# Patient Record
Sex: FEMALE | Employment: OTHER | URBAN - METROPOLITAN AREA
[De-identification: names, ages, dates, MRNs, and addresses within clinical notes are randomized per-mention and may not be internally consistent; named-entity substitution may affect disease eponyms.]

---

## 2017-02-14 ENCOUNTER — PATIENT OUTREACH (OUTPATIENT)
Dept: FAMILY MEDICINE CLINIC | Age: 67
End: 2017-02-14

## 2017-02-14 ENCOUNTER — OFFICE VISIT (OUTPATIENT)
Dept: FAMILY MEDICINE CLINIC | Age: 67
End: 2017-02-14

## 2017-02-14 VITALS
DIASTOLIC BLOOD PRESSURE: 80 MMHG | RESPIRATION RATE: 16 BRPM | HEIGHT: 61 IN | BODY MASS INDEX: 34.32 KG/M2 | WEIGHT: 181.8 LBS | OXYGEN SATURATION: 95 % | SYSTOLIC BLOOD PRESSURE: 128 MMHG | HEART RATE: 80 BPM | TEMPERATURE: 98.6 F

## 2017-02-14 DIAGNOSIS — Z79.4 UNCONTROLLED TYPE 2 DIABETES MELLITUS WITH HYPERGLYCEMIA, WITH LONG-TERM CURRENT USE OF INSULIN (HCC): Primary | ICD-10-CM

## 2017-02-14 DIAGNOSIS — E11.65 UNCONTROLLED TYPE 2 DIABETES MELLITUS WITH HYPERGLYCEMIA, WITH LONG-TERM CURRENT USE OF INSULIN (HCC): Primary | ICD-10-CM

## 2017-02-14 LAB — HBA1C MFR BLD HPLC: 10.4 %

## 2017-02-14 NOTE — PROGRESS NOTES
Nurse history read and confirmed by patient. States taking usual dose of insulin but A1c today > 10. No recent f/u with endo. States taking all of her current meds but review of refill history shows should be out of several of her meds. Advised patient she needs to heath appt ASAP with her doctor at [facility] who is functioning as her endo and she needs to bring all of her med bottles in at a NV w/i the next week to go over what she has and has not been taking and to refill her meds as approp.     Visit Vitals    /80 (BP 1 Location: Right arm, BP Patient Position: Sitting)    Pulse 80    Temp 98.6 °F (37 °C) (Oral)    Resp 16    Ht 5' 1\" (1.549 m)    Wt 181 lb 12.8 oz (82.5 kg)    SpO2 95%    BMI 34.35 kg/m2

## 2017-02-14 NOTE — PROGRESS NOTES
[address]    Pt listed on 2/13/17 MSSP-P ISABEL NOONAN ED Report. Seen @ [facility] EMERGENCY [facility] ED 2/10/17. Lurn[facility] system reviewed. 2/10/17 ED Provider Report retrieved & reviewed. Diagnoses:  Uncontrolled DM, URI    -436 during ED visit. Given NaCl Bolus & Reg Insulin 10 units IV. Social Hx- \"from out of town, visiting locally\". Discharge Plan/Instructions:  1. Disposition- home   2. ABX- Z pac  3. PCP f/u    Per EMR review pt was seen in office today by Dr Jaida Viveros. A1C >10. Pt instructed to see provider @ [facility] managing DM as soon as possible. No Rxs given. Pt instructed to return in 1 week with all meds for med review & refills. NN did not meet with pt when in office. Barriers: hx of f/u with multiple providers. Pt moved to [place]. Returns to visit in [place]. [facility] NN has spoken with pt in past regarding establishing with PCP in [place] for Continuity of Care & management of DM & other chronic conditions. This NN has attempted to contact pt for f/u on PCP status w/o pt response to messages left. Per [address] pt sees Masood Smiley MD for DM Management. [facility] site reviewed. No recent OVs listed. Last notation a \"Communication\" note dated 12/13/16- \"Spoke with Pina Montiel, [phone], from [facility], who was calling to clarify that pt. sees Dr. Thomas Barrow for diabetes only. Pt. has been seeing Dr. Samantha Kaba at [facility] for a long time now. Spoke to Dr. Thomas Barrow who confirms that she only sees patient for diabetes and does not see her as a pcp. Noted that pt. does call clinic to schedule appointments for sick visits with other physicians at [facility]". Per EMR review no f/u NV appt scheduled by pt today. Plan:  Pt outreach to discuss/review importance of continuity of care/management of chronic conditions & associated risk factors.

## 2017-02-14 NOTE — PROGRESS NOTES
Here for follow up. She has been sick for the last couple week and has been to PF in [address] have had her on 2 different antibiotics. Has also been [address] since her sugar was so high--Iris has the notes from the visit. Patient still weak and dizzy. She has not seen her DM specialist. Had been in [place] until the first week of December--came back \" just in time to get sick\". She does not have ENDO in [place] either. She is planning on staying in [place]-will only go to [place] to visit, not to live. Right to Decide booklet given to patient and encouraged to discuss with family. PF notes requested for stat delivery. We have the most recent note from Dr Lena Hunt who follows her DM. Patient said he had eye surgery while in [place] and this really helped her lifelong vision issue.  She does not remember the name of the MD who did the procedure

## 2017-02-14 NOTE — MR AVS SNAPSHOT
Visit Information Date & Time Provider Department Dept. Phone Encounter #  
 2/14/2017  3:30 PM Marifer Delgadillo MD [facility] [phone] [id number] Follow-up Instructions Return in about 1 week (around 2/21/2017) for NV to go over meds. Upcoming Health Maintenance Date Due OSTEOPOROSIS SCREENING (DEXA) 5/15/2017* MEDICARE YEARLY EXAM 5/15/2017* FOOT EXAM Q1 5/15/2017* MICROALBUMIN Q1 5/15/2017* EYE EXAM RETINAL OR DILATED Q1 5/15/2017* LIPID PANEL Q1 5/15/2017* GLAUCOMA SCREENING Q2Y 4/30/2017 HEMOGLOBIN A1C Q6M 8/14/2017 BREAST CANCER SCRN MAMMOGRAM 12/7/2017 COLONOSCOPY 7/22/2021 DTaP/Tdap/Td series (2 - Td) 10/10/2022 *Topic was postponed. The date shown is not the original due date. Allergies as of 2/14/2017  Review Complete On: 2/14/2017 By: Katie Wilkins LPN Severity Noted Reaction Type Reactions Cefdinir High 01/12/2016    Other (comments) Arthralgias and serum sickness Advil Cold & Sinus [Pseudoephedrine-ibuprofen]  09/11/2009    Palpitations Cefzil [Cefprozil]  09/11/2009    Rash Ciprofloxacin  01/12/2016    Other (comments) arthralgias Invokana [Canagliflozin]  01/12/2016    Other (comments) Caused her to have frequent UTI's Naprosyn [Naproxen]  09/11/2009    Other (comments) Blisters in mouth Current Immunizations  Reviewed on 10/6/2015 Name Date IPV 5/30/2002 Influenza High Dose Vaccine PF 10/6/2015 Influenza Vaccine PF 12/17/2013 Influenza Vaccine Split 10/10/2012, 11/16/2010 Pneumococcal Polysaccharide (PPSV-23) 6/3/2014 Pneumococcal Vaccine (Unspecified Type) 6/17/2003 TD Vaccine 6/10/1997 TDAP Vaccine 10/10/2012 Zoster Vaccine, Live 4/24/2013 Not reviewed this visit You Were Diagnosed With   
  
 Codes Comments  Uncontrolled type 2 diabetes mellitus with hyperglycemia, with long-term current use of insulin ([facility].)    -  Primary ICD-10-CM: E11.65, Z79.4 ICD-9-CM: 250.02, V58.67 Vitals BP Pulse Temp Resp Height(growth percentile) Weight(growth percentile) 128/80 (BP 1 Location: Right arm, BP Patient Position: Sitting) 80 98.6 °F (37 °C) (Oral) 16 5' 1\" (1.549 m) 181 lb 12.8 oz (82.5 kg) SpO2 BMI OB Status Smoking Status 95% 34.35 kg/m2 Postmenopausal Never Smoker Vitals History BMI and BSA Data Body Mass Index Body Surface Area  
 34.35 kg/m 2 1.88 m 2 Preferred Pharmacy Pharmacy Name Phone [address] [facility] [phone] Your Updated Medication List  
  
   
This list is accurate as of: 2/14/17  3:47 PM.  Always use your most recent med list.  
  
  
  
  
 buPROPion  mg tablet Commonly known as:  WELLBUTRIN XL  
TAKE 1 TABLET DAILY coenzyme q10-vitamin e 100-100 mg-unit Cap Take  by mouth.  
  
 ergocalciferol (vitamin d2) 1,000 unit Cap Take 2,000 Int'l Units/L by mouth.  
  
 escitalopram oxalate 20 mg tablet Commonly known as:  Kristina London TAKE 1 TABLET DAILY HumaLOG 100 unit/mL injection Generic drug:  insulin lispro 10 Units by SubCUTAneous route three (3) times daily. indapamide 2.5 mg tablet Commonly known as:  LOZOL  
TAKE 2 TABLETS EVERY MORNING KRILL OIL PO Take  by mouth daily. LEVEMIR FLEXPEN 100 unit/mL (3 mL) Inpn Generic drug:  insulin detemir 32 Units by SubCUTAneous route. Every morning  
  
 lisinopril 10 mg tablet Commonly known as:  PRINIVIL, ZESTRIL  
TAKE 1 TABLET DAILY  
  
 metFORMIN 500 mg tablet Commonly known as:  GLUCOPHAGE  
TAKE 2 TABLETS DAILY WITH BREAKFAST  
  
 metoprolol succinate 200 mg XL tablet Commonly known as:  TOPROL-XL  
TAKE 1 TABLET DAILY  
  
 ONETOUCH ULTRA TEST strip Generic drug:  glucose blood VI test strips  
by Does Not Apply route See Admin Instructions. ONETOUCH ULTRA2 monitoring kit Generic drug:  Blood-Glucose Meter  
by Does Not Apply route. ONETOUCH ULTRASOFT LANCETS Misc Generic drug:  Lancets  
by Does Not Apply route. QUEtiapine 25 mg tablet Commonly known as:  SEROquel TAKE 3 TABLETS NIGHTLY We Performed the Following AMB POC HEMOGLOBIN A1C [00239 CPT(R)] Follow-up Instructions Return in about 1 week (around 2/21/2017) for NV to go over meds. Introducing [facility]! Jazmine Small introduces Proofpoint patient portal. Now you can access parts of your medical record, email your doctor's office, and request medication refills online. 1. In your internet browser, go to [email or web address] 2. Click on the First Time User? Click Here link in the Sign In box. You will see the New Member Sign Up page. 3. Enter your Proofpoint Access Code exactly as it appears below. You will not need to use this code after youve completed the sign-up process. If you do not sign up before the expiration date, you must request a new code. · Proofpoint Access Code: [email or web address] Expires: 5/15/2017  1:56 PM 
 
4. Enter the last four digits of your Social Security Number (xxxx) and Date of Birth (mm/dd/yyyy) as indicated and click Submit. You will be taken to the next sign-up page. 5. Create a Proofpoint ID. This will be your Proofpoint login ID and cannot be changed, so think of one that is secure and easy to remember. 6. Create a Proofpoint password. You can change your password at any time. 7. Enter your Password Reset Question and Answer. This can be used at a later time if you forget your password. 8. Enter your e-mail address. You will receive e-mail notification when new information is available in [address] 9. Click Sign Up. You can now view and download portions of your medical record. 10. Click the Download Summary menu link to download a portable copy of your medical information. If you have questions, please visit the Frequently Asked Questions section of the [employer] website. Remember, [employer] is NOT to be used for urgent needs. For medical emergencies, dial 911. Now available from your iPhone and Android! Please provide this summary of care documentation to your next provider. Your primary care clinician is listed as [phone] If you have any questions after today's visit, please call [phone].

## 2017-03-27 PROBLEM — H04.123 DRY EYES: Status: ACTIVE | Noted: 2017-03-20

## 2017-03-27 PROBLEM — H11.30 SUBCONJUNCTIVAL HEMORRHAGE: Status: ACTIVE | Noted: 2017-03-20

## 2017-04-07 RX ORDER — ESCITALOPRAM OXALATE 20 MG/1
TABLET ORAL
Qty: 90 TAB | Refills: 1 | OUTPATIENT
Start: 2017-04-07

## 2017-04-07 NOTE — TELEPHONE ENCOUNTER
Refill request per fax from [address]. She was in the office for follow up mid Feb and she needs to come back in to the office and bring the bottles of all her current meds for review. This is what Dr Ade Elise had told her at the Feb visit. The IM med MD at [facility] only handles her DM and they will not fill her all of her other meds.

## 2017-05-03 ENCOUNTER — OFFICE VISIT (OUTPATIENT)
Dept: FAMILY MEDICINE CLINIC | Age: 67
End: 2017-05-03

## 2017-05-03 VITALS
TEMPERATURE: 97.3 F | SYSTOLIC BLOOD PRESSURE: 126 MMHG | HEIGHT: 61 IN | DIASTOLIC BLOOD PRESSURE: 79 MMHG | RESPIRATION RATE: 16 BRPM | OXYGEN SATURATION: 95 % | WEIGHT: 174.7 LBS | HEART RATE: 80 BPM | BODY MASS INDEX: 32.98 KG/M2

## 2017-05-03 DIAGNOSIS — R39.14 FEELING OF INCOMPLETE BLADDER EMPTYING: Primary | ICD-10-CM

## 2017-05-03 DIAGNOSIS — E11.65 UNCONTROLLED TYPE 2 DIABETES MELLITUS WITH HYPERGLYCEMIA, WITH LONG-TERM CURRENT USE OF INSULIN (HCC): ICD-10-CM

## 2017-05-03 DIAGNOSIS — Z79.4 UNCONTROLLED TYPE 2 DIABETES MELLITUS WITH HYPERGLYCEMIA, WITH LONG-TERM CURRENT USE OF INSULIN (HCC): ICD-10-CM

## 2017-05-03 DIAGNOSIS — L40.9 PSORIASIS: ICD-10-CM

## 2017-05-03 RX ORDER — LIRAGLUTIDE 6 MG/ML
1.8 INJECTION SUBCUTANEOUS DAILY
COMMUNITY
Start: 2017-03-02 | End: 2017-08-02 | Stop reason: SINTOL

## 2017-05-03 NOTE — MR AVS SNAPSHOT
Visit Information Date & Time Provider Department Dept. Phone Encounter #  
 5/3/2017 11:45 AM Robin Dutton MD [facility] [phone] [id number] Follow-up Instructions Return if symptoms worsen or fail to improve. Your Appointments 5/3/2017 11:45 AM  
ROUTINE CARE with MD [facility] [address]) Appt Note: Routine f/up check., DM, med renewal - (30 min) -lp [address] [facility] [address]  
[phone]  
  
   
 [address] [facility] [address] [facility] [address] [facility] Upcoming Health Maintenance Date Due OSTEOPOROSIS SCREENING (DEXA) 5/15/2017* MEDICARE YEARLY EXAM 5/15/2017* FOOT EXAM Q1 5/15/2017* MICROALBUMIN Q1 5/15/2017* LIPID PANEL Q1 5/15/2017* INFLUENZA AGE 9 TO ADULT 8/1/2017 HEMOGLOBIN A1C Q6M 8/14/2017 EYE EXAM RETINAL OR DILATED Q1 10/4/2017 BREAST CANCER SCRN MAMMOGRAM 12/7/2017 GLAUCOMA SCREENING Q2Y 10/4/2018 COLONOSCOPY 7/22/2021 DTaP/Tdap/Td series (2 - Td) 10/10/2022 *Topic was postponed. The date shown is not the original due date. Allergies as of 5/3/2017  Review Complete On: 5/3/2017 By: Alexandria Garcia RN Severity Noted Reaction Type Reactions Cefdinir High 01/12/2016    Other (comments) Arthralgias and serum sickness Advil Cold & Sinus [Pseudoephedrine-ibuprofen]  09/11/2009    Palpitations Cefzil [Cefprozil]  09/11/2009    Rash Ciprofloxacin  01/12/2016    Other (comments) arthralgias Invokana [Canagliflozin]  01/12/2016    Other (comments) Caused her to have frequent UTI's Naprosyn [Naproxen]  09/11/2009    Other (comments) Blisters in mouth Current Immunizations  Reviewed on 10/6/2015 Name Date IPV 5/30/2002 Influenza High Dose Vaccine PF 10/6/2015 Influenza Vaccine PF 12/17/2013 Influenza Vaccine Split 10/10/2012, 11/16/2010 Pneumococcal Polysaccharide (PPSV-23) 6/3/2014 Pneumococcal Vaccine (Unspecified Type) 6/17/2003 TD Vaccine 6/10/1997 TDAP Vaccine 10/10/2012 Zoster Vaccine, Live 4/24/2013 Not reviewed this visit You Were Diagnosed With   
  
 Codes Comments Feeling of incomplete bladder emptying    -  Primary ICD-10-CM: R39.14 ICD-9-CM: 788.21 Psoriasis     ICD-10-CM: L40.9 ICD-9-CM: 696.1 Uncontrolled type 2 diabetes mellitus with hyperglycemia, with long-term current use of insulin (HCC)     ICD-10-CM: E11.65, Z79.4 ICD-9-CM: 250.02, V58.67 Vitals BP Pulse Temp Resp Height(growth percentile) Weight(growth percentile) 126/79 (BP 1 Location: Left arm, BP Patient Position: Sitting) 80 97.3 °F (36.3 °C) (Oral) 16 5' 1\" (1.549 m) 174 lb 11.2 oz (79.2 kg) SpO2 BMI OB Status Smoking Status 95% 33.01 kg/m2 Postmenopausal Never Smoker Vitals History BMI and BSA Data Body Mass Index Body Surface Area 33.01 kg/m 2 1.85 m 2 Preferred Pharmacy Pharmacy Name Phone [address] [facility] [phone] Your Updated Medication List  
  
   
This list is accurate as of: 5/3/17 11:22 AM.  Always use your most recent med list.  
  
  
  
  
 buPROPion  mg tablet Commonly known as:  WELLBUTRIN XL  
TAKE 1 TABLET DAILY  
  
 escitalopram oxalate 20 mg tablet Commonly known as:  Mart Bandar TAKE 1 TABLET DAILY HumaLOG 100 unit/mL injection Generic drug:  insulin lispro 10 Units by SubCUTAneous route three (3) times daily. indapamide 2.5 mg tablet Commonly known as:  LOZOL  
TAKE 2 TABLETS EVERY MORNING  
  
 LEVEMIR FLEXPEN 100 unit/mL (3 mL) Inpn Generic drug:  insulin detemir 32 Units by SubCUTAneous route. Every morning  
  
 lisinopril 10 mg tablet Commonly known as:  PRINIVIL, ZESTRIL  
TAKE 1 TABLET DAILY  
  
 metFORMIN 500 mg tablet Commonly known as:  GLUCOPHAGE  
 TAKE 2 TABLETS DAILY WITH BREAKFAST  
  
 metoprolol succinate 200 mg XL tablet Commonly known as:  TOPROL-XL  
TAKE 1 TABLET DAILY  
  
 ONETOUCH ULTRA TEST strip Generic drug:  glucose blood VI test strips  
by Does Not Apply route See Admin Instructions. ONETOUCH ULTRA2 monitoring kit Generic drug:  Blood-Glucose Meter  
by Does Not Apply route. ONETOUCH ULTRASOFT LANCETS Misc Generic drug:  Lancets  
by Does Not Apply route. QUEtiapine 25 mg tablet Commonly known as:  SEROquel TAKE 3 TABLETS NIGHTLY  
  
 VICTOZA 3-ASMITA 0.6 mg/0.1 mL (18 mg/3 mL) sub-q pen Generic drug:  Liraglutide 1.8 mg by SubCUTAneous route daily. We Performed the Following REFERRAL TO FEMALE PELVIC MEDICINE AND RECONSTRUCTIVE SURGERY [NJL558 Custom] Follow-up Instructions Return if symptoms worsen or fail to improve. Referral Information Referral ID Referred By Referred To  
  
 [id number] Josh APARICIO MD   
   [facility] [address] [facility], [address] Phone: [phone] Fax: [phone] Visits Status Start Date End Date 1 New Request 5/3/17 5/3/18 If your referral has a status of pending review or denied, additional information will be sent to support the outcome of this decision. Introducing \[facility]\"" & HEALTH SERVICES! [employer] introduces CoffeeTable patient portal. Now you can access parts of your medical record, email your doctor's office, and request medication refills online. 1. In your internet browser, go to [email or web address] 2. Click on the First Time User? Click Here link in the Sign In box. You will see the New Member Sign Up page. 3. Enter your CoffeeTable Access Code exactly as it appears below. You will not need to use this code after youve completed the sign-up process. If you do not sign up before the expiration date, you must request a new code. · [employer] Access Code: [email or web address] Expires: 5/15/2017  2:56 PM 
 
4. Enter the last four digits of your Social Security Number (xxxx) and Date of Birth (mm/dd/yyyy) as indicated and click Submit. You will be taken to the next sign-up page. 5. Create a Industri[employer] ID. This will be your [employer] login ID and cannot be changed, so think of one that is secure and easy to remember. 6. Create a [employer] password. You can change your password at any time. 7. Enter your Password Reset Question and Answer. This can be used at a later time if you forget your password. 8. Enter your e-mail address. You will receive e-mail notification when new information is available in [address] 9. Click Sign Up. You can now view and download portions of your medical record. 10. Click the Download Summary menu link to download a portable copy of your medical information. If you have questions, please visit the Frequently Asked Questions section of the [employer] website. Remember, [employer] is NOT to be used for urgent needs. For medical emergencies, dial 911. Now available from your iPhone and Android! Please provide this summary of care documentation to your next provider. Your primary care clinician is listed as [phone] If you have any questions after today's visit, please call [phone].

## 2017-05-03 NOTE — PROGRESS NOTES
Used cream for psoriasis which worked on upper left arm. Doesn't know name of the cream  Will find out from MD or pharm in [place]. Needs ref to uroGyn for inability to empty bladder. Nurse history read and confirmed by patient. Visit Vitals    /79 (BP 1 Location: Left arm, BP Patient Position: Sitting)    Pulse 80    Temp 97.3 °F (36.3 °C) (Oral)    Ht 5' 1\" (1.549 m)    Wt 174 lb 11.2 oz (79.2 kg)    SpO2 95%    BMI 33.01 kg/m2       Patient alert and cooperative. Erythematous scaly patch left upper arm. Assessment:  1. Apparent psoriasis. Plan:  1. Patient will find out name of previous cream she got from doctor in [place] that worked well on this area. 2. Set up uro-gyn referral for difficulty with full bladder emptying, Dr. Jane Yap. 3. Follow otherwise here prn.  4. Is apparently getting refills and diabetic care from her physician at [facility].

## 2017-05-03 NOTE — PROGRESS NOTES
Chief Complaint   Patient presents with    Diabetes    Skin Problem     Cream for psoriasis.  Incomplete Bladder Emptying     Need a referral to a specialist.     1. Have you been to the ER, urgent care clinic since your last visit? Hospitalized since your last visit? Yes When: 2017 Where: [facility] Reason for visit: Blood sugar elevated. 2. Have you seen or consulted any other health care providers outside of the [facility] since your last visit? Include any pap smears or colon screening. Yes When: 2017 Where: [facility] Reason for visit: Liver biopsy    I have reviewed Health Maintenance with the patient and updated. Advance Care Planning information reviewed and given to the patient at a previous visit. The patient was counseled on the dangers of tobacco use, and Patient is a non smoker. Reviewed strategies to maximize success, including Continue not to smoke.

## 2017-05-09 RX ORDER — HALOBETASOL PROPIONATE 0.5 MG/G
CREAM TOPICAL 2 TIMES DAILY
Qty: 15 G | Refills: 0 | Status: SHIPPED | OUTPATIENT
Start: 2017-05-09 | End: 2017-08-02 | Stop reason: SDUPTHER

## 2017-06-13 DIAGNOSIS — G47.00 INSOMNIA, UNSPECIFIED TYPE: ICD-10-CM

## 2017-06-13 DIAGNOSIS — R45.86 MOOD CHANGES: ICD-10-CM

## 2017-06-13 RX ORDER — QUETIAPINE FUMARATE 25 MG/1
TABLET, FILM COATED ORAL
Qty: 90 TAB | Refills: 2 | OUTPATIENT
Start: 2017-06-13

## 2017-06-13 NOTE — TELEPHONE ENCOUNTER
Patient calling to request a refill for her seroquel 25 mg to be called into the [facility] on [facility]. Her contact # is [phone].

## 2017-06-15 NOTE — TELEPHONE ENCOUNTER
Patient came by the office with the empty bottle of the Seroquel  It was written 11/17/16 by Dr Samir Szymanski for 629+1  She got the 2nd refill on 2/17/17 from [facility]  Refilled pended to PCP  She was in the office in May for visit

## 2017-06-15 NOTE — TELEPHONE ENCOUNTER
Patient will bring the bottle to the office for review. Med is ordered to take 3 at hs but she rations to a lesser dose if she is running low.  3 tablets is the most effective dose for her

## 2017-06-19 RX ORDER — QUETIAPINE FUMARATE 25 MG/1
TABLET, FILM COATED ORAL
Qty: 270 TAB | Refills: 1 | Status: SHIPPED | OUTPATIENT
Start: 2017-06-19 | End: 2017-12-16 | Stop reason: SDUPTHER

## 2017-06-19 NOTE — TELEPHONE ENCOUNTER
Patient came by the office with the empty bottle of the Seroquel  It was written 11/17/16 by Dr Gregorio Yu for 672+3  She got the 2nd refill on 2/17/17 from [facility]  Refilled pended to PCP  She was in the office in May for visit [address] [facility] [address]

## 2017-07-06 NOTE — TELEPHONE ENCOUNTER
She is on this once daily and needs a refill-will fax back the fax request we rec'd. Patient verified this is a current med for her.  She is due in for [facility] and to update her HM and for annual fasting labs with next open slot with Dr Anthony Garcia

## 2017-07-09 RX ORDER — ESCITALOPRAM OXALATE 20 MG/1
TABLET ORAL
Qty: 90 TAB | Refills: 0
Start: 2017-07-09 | End: 2017-09-20 | Stop reason: SDUPTHER

## 2017-07-21 ENCOUNTER — TELEPHONE (OUTPATIENT)
Dept: FAMILY MEDICINE CLINIC | Age: 67
End: 2017-07-21

## 2017-07-21 NOTE — TELEPHONE ENCOUNTER
Left message on name ID'd voicemail that we do not follow her DM at this office. We did do a A1c 2/14/17 and it was 10.4  Patient sees Dr Shayna Nails at [facility] for this-info for her office left on the voicemail.  Patient also see providers in [place] when she lives there

## 2017-07-21 NOTE — TELEPHONE ENCOUNTER
----- Message from Billy Bradley sent at 7/21/2017  3:59 PM EDT -----  Regarding: Dr. Familia Reed, RN, with [facility], is requesting a call back from the practice to see if the pt has had A1C testing done. Best contact number [phone] ext [phone].

## 2017-07-26 ENCOUNTER — LAB ONLY (OUTPATIENT)
Dept: FAMILY MEDICINE CLINIC | Age: 67
End: 2017-07-26

## 2017-07-26 DIAGNOSIS — E55.9 VITAMIN D DEFICIENCY: ICD-10-CM

## 2017-07-26 DIAGNOSIS — Z79.4 UNCONTROLLED TYPE 2 DIABETES MELLITUS WITH HYPERGLYCEMIA, WITH LONG-TERM CURRENT USE OF INSULIN (HCC): ICD-10-CM

## 2017-07-26 DIAGNOSIS — I10 ESSENTIAL HYPERTENSION: ICD-10-CM

## 2017-07-26 DIAGNOSIS — E78.00 PURE HYPERCHOLESTEROLEMIA: ICD-10-CM

## 2017-07-26 DIAGNOSIS — E11.65 UNCONTROLLED TYPE 2 DIABETES MELLITUS WITH HYPERGLYCEMIA, WITH LONG-TERM CURRENT USE OF INSULIN (HCC): ICD-10-CM

## 2017-07-26 DIAGNOSIS — R45.86 MOOD CHANGES: ICD-10-CM

## 2017-07-26 DIAGNOSIS — R35.0 URINARY FREQUENCY: ICD-10-CM

## 2017-07-26 NOTE — LETTER
7/27/2017 6:25 PM 
 
Ms. [address] [facility] [address] [address] Dear 600 [facility]: Please find your most recent results below. Resulted Orders HEMOGLOBIN A1C WITH EAG Result Value Ref Range Hemoglobin A1c 6.7 (H) 4.8 - 5.6 % Comment:  
            Pre-diabetes: 5.7 - 6.4 Diabetes: >6.4 Glycemic control for adults with diabetes: <7.0 Estimated average glucose 146 mg/dL Narrative Performed at:  [address]  [id number] : Aidan Roy MD, Phone:  [phone] VITAMIN D, 25 HYDROXY Result Value Ref Range VITAMIN D, 25-HYDROXY 26.5 (L) 30.0 - 100.0 ng/mL Comment:  
   Vitamin D deficiency has been defined by the [address] [place] [address] practice guideline as a 
level of serum 25-OH vitamin D less than 20 ng/mL (1,2). The Endocrine Society went on to further define vitamin D 
insufficiency as a level between 21 and 29 ng/mL (2). 1. IOM ([place] of [employer]). 2010. Dietary reference 
   intakes for calcium and D. [facility]: The 
   [employer]. 2. Renaldo MF, Nova NC, Aubree-Kenan HOLDER, et al. 
   Evaluation, treatment, and prevention of vitamin D 
   deficiency: an Endocrine Society clinical practice 
   guideline. JCEM. 2011 Jul; 96(7):1911-30. Narrative Performed at:  [address]  [id number] : Aidan Roy MD, Phone:  [phone] MICROALBUMIN, UR, RAND W/ MICROALBUMIN/CREA RATIO Result Value Ref Range Creatinine, urine 72.6 Not Estab. mg/dL Microalbumin, urine 6.8 Not Estab. ug/mL Microalb/Creat ratio (ug/mg creat.) 9.4 0.0 - 30.0 mg/g creat Narrative Performed at:  [address]  [id number] : Aidan Roy MD, Phone:  [phone] CBC WITH AUTOMATED DIFF  
 Result Value Ref Range WBC 7.6 3.4 - 10.8 x10E3/uL  
 RBC 3.97 3.77 - 5.28 x10E6/uL HGB 11.0 (L) 11.1 - 15.9 g/dL HCT 34.5 34.0 - 46.6 % MCV 87 79 - 97 fL  
 MCH 27.7 26.6 - 33.0 pg  
 MCHC 31.9 31.5 - 35.7 g/dL  
 RDW 14.7 12.3 - 15.4 % PLATELET 142 (L) 718 - 379 x10E3/uL NEUTROPHILS 74 % Lymphocytes 19 % MONOCYTES 6 % EOSINOPHILS 1 % BASOPHILS 0 %  
 ABS. NEUTROPHILS 5.6 1.4 - 7.0 x10E3/uL Abs Lymphocytes 1.4 0.7 - 3.1 x10E3/uL  
 ABS. MONOCYTES 0.4 0.1 - 0.9 x10E3/uL  
 ABS. EOSINOPHILS 0.1 0.0 - 0.4 x10E3/uL  
 ABS. BASOPHILS 0.0 0.0 - 0.2 x10E3/uL IMMATURE GRANULOCYTES 0 %  
 ABS. IMM. GRANS. 0.0 0.0 - 0.1 x10E3/uL Narrative Performed at:  [address]  [id number] : Matheus Iqbal MD, Phone:  [phone] URINALYSIS W/ RFLX MICROSCOPIC Result Value Ref Range Specific Gravity 1.017 1.005 - 1.030  
 pH (UA) 5.5 5.0 - 7.5 Color Yellow Yellow Appearance Cloudy (A) Clear Leukocyte Esterase 2+ (A) Negative Protein Negative Negative/Trace Glucose Negative Negative Ketone Negative Negative Blood Negative Negative Bilirubin Negative Negative Urobilinogen 0.2 0.2 - 1.0 mg/dL Nitrites Negative Negative Microscopic Examination See additional order Comment:  
   Microscopic was indicated and was performed. Narrative Performed at:  [address]  [id number] : Matheus Iqbal MD, Phone:  [phone] TSH 3RD GENERATION Result Value Ref Range TSH 1.650 0.450 - 4.500 uIU/mL Narrative Performed at:  [address]  [id number] : Matheus Iqbal MD, Phone:  [phone] METABOLIC PANEL, COMPREHENSIVE Result Value Ref Range Glucose 140 (H) 65 - 99 mg/dL BUN 21 8 - 27 mg/dL Creatinine 0.86 0.57 - 1.00 mg/dL  GFR est non-AA 70 >59 mL/min/1.73  
 GFR est AA 80 >59 mL/min/1.73  
 BUN/Creatinine ratio 24 12 - 28 Sodium 145 (H) 134 - 144 mmol/L Potassium 4.8 3.5 - 5.2 mmol/L Chloride 107 (H) 96 - 106 mmol/L  
 CO2 25 18 - 29 mmol/L Calcium 8.4 (L) 8.7 - 10.3 mg/dL Protein, total 6.1 6.0 - 8.5 g/dL Albumin 3.9 3.6 - 4.8 g/dL GLOBULIN, TOTAL 2.2 1.5 - 4.5 g/dL A-G Ratio 1.8 1.2 - 2.2 Bilirubin, total 0.3 0.0 - 1.2 mg/dL Alk. phosphatase 105 39 - 117 IU/L  
 AST (SGOT) 22 0 - 40 IU/L  
 ALT (SGPT) 23 0 - 32 IU/L Narrative Performed at:  [address]  [id number] : Deb Corral MD, Phone:  [phone] LIPID PANEL Result Value Ref Range Cholesterol, total 145 100 - 199 mg/dL Triglyceride 362 (H) 0 - 149 mg/dL HDL Cholesterol 27 (L) >39 mg/dL VLDL, calculated 72 (H) 5 - 40 mg/dL LDL, calculated 46 0 - 99 mg/dL Narrative Performed at:  [address]  [id number] : Deb Corral MD, Phone:  [phone] MICROSCOPIC EXAMINATION Result Value Ref Range WBC 0-5 0 - 5 /hpf  
 RBC 0-2 0 - 2 /hpf Epithelial cells 0-10 0 - 10 /hpf Casts None seen None seen /lpf Mucus Present Not Estab. Bacteria Few None seen/Few Narrative Performed at:  [address]  [id number] : Deb Corral MD, Phone:  [phone] CVD REPORT Result Value Ref Range INTERPRETATION Note Comment:  
   Supplement report is available. Narrative Performed at:  [address] [place] [address]  [id number] : Elsy Garcia PhD, Phone:  [phone] DIABETES PATIENT EDUCATION Result Value Ref Range PDF Image Not applicable Narrative Performed at:  [address], [place]  [id number] : Elsy Garcia PhD, Phone:  [phone] Please call me if you have any questions: [phone] Sincerely, Lab Briseidafp

## 2017-07-27 LAB
25(OH)D3+25(OH)D2 SERPL-MCNC: 26.5 NG/ML (ref 30–100)
ALBUMIN SERPL-MCNC: 3.9 G/DL (ref 3.6–4.8)
ALBUMIN/CREAT UR: 9.4 MG/G CREAT (ref 0–30)
ALBUMIN/GLOB SERPL: 1.8 {RATIO} (ref 1.2–2.2)
ALP SERPL-CCNC: 105 IU/L (ref 39–117)
ALT SERPL-CCNC: 23 IU/L (ref 0–32)
APPEARANCE UR: ABNORMAL
AST SERPL-CCNC: 22 IU/L (ref 0–40)
BACTERIA #/AREA URNS HPF: NORMAL /[HPF]
BASOPHILS # BLD AUTO: 0 X10E3/UL (ref 0–0.2)
BASOPHILS NFR BLD AUTO: 0 %
BILIRUB SERPL-MCNC: 0.3 MG/DL (ref 0–1.2)
BILIRUB UR QL STRIP: NEGATIVE
BUN SERPL-MCNC: 21 MG/DL (ref 8–27)
BUN/CREAT SERPL: 24 (ref 12–28)
CALCIUM SERPL-MCNC: 8.4 MG/DL (ref 8.7–10.3)
CASTS URNS QL MICRO: NORMAL /LPF
CHLORIDE SERPL-SCNC: 107 MMOL/L (ref 96–106)
CHOLEST SERPL-MCNC: 145 MG/DL (ref 100–199)
CO2 SERPL-SCNC: 25 MMOL/L (ref 18–29)
COLOR UR: YELLOW
CREAT SERPL-MCNC: 0.86 MG/DL (ref 0.57–1)
CREAT UR-MCNC: 72.6 MG/DL
EOSINOPHIL # BLD AUTO: 0.1 X10E3/UL (ref 0–0.4)
EOSINOPHIL NFR BLD AUTO: 1 %
EPI CELLS #/AREA URNS HPF: NORMAL /HPF
ERYTHROCYTE [DISTWIDTH] IN BLOOD BY AUTOMATED COUNT: 14.7 % (ref 12.3–15.4)
EST. AVERAGE GLUCOSE BLD GHB EST-MCNC: 146 MG/DL
GLOBULIN SER CALC-MCNC: 2.2 G/DL (ref 1.5–4.5)
GLUCOSE SERPL-MCNC: 140 MG/DL (ref 65–99)
GLUCOSE UR QL: NEGATIVE
HBA1C MFR BLD: 6.7 % (ref 4.8–5.6)
HCT VFR BLD AUTO: 34.5 % (ref 34–46.6)
HDLC SERPL-MCNC: 27 MG/DL
HGB BLD-MCNC: 11 G/DL (ref 11.1–15.9)
HGB UR QL STRIP: NEGATIVE
IMM GRANULOCYTES # BLD: 0 X10E3/UL (ref 0–0.1)
IMM GRANULOCYTES NFR BLD: 0 %
INTERPRETATION, 910389: NORMAL
KETONES UR QL STRIP: NEGATIVE
LDLC SERPL CALC-MCNC: 46 MG/DL (ref 0–99)
LEUKOCYTE ESTERASE UR QL STRIP: ABNORMAL
LYMPHOCYTES # BLD AUTO: 1.4 X10E3/UL (ref 0.7–3.1)
LYMPHOCYTES NFR BLD AUTO: 19 %
Lab: NORMAL
MCH RBC QN AUTO: 27.7 PG (ref 26.6–33)
MCHC RBC AUTO-ENTMCNC: 31.9 G/DL (ref 31.5–35.7)
MCV RBC AUTO: 87 FL (ref 79–97)
MICRO URNS: ABNORMAL
MICROALBUMIN UR-MCNC: 6.8 UG/ML
MONOCYTES # BLD AUTO: 0.4 X10E3/UL (ref 0.1–0.9)
MONOCYTES NFR BLD AUTO: 6 %
MUCOUS THREADS URNS QL MICRO: PRESENT
NEUTROPHILS # BLD AUTO: 5.6 X10E3/UL (ref 1.4–7)
NEUTROPHILS NFR BLD AUTO: 74 %
NITRITE UR QL STRIP: NEGATIVE
PH UR STRIP: 5.5 [PH] (ref 5–7.5)
PLATELET # BLD AUTO: 140 X10E3/UL (ref 150–379)
POTASSIUM SERPL-SCNC: 4.8 MMOL/L (ref 3.5–5.2)
PROT SERPL-MCNC: 6.1 G/DL (ref 6–8.5)
PROT UR QL STRIP: NEGATIVE
RBC # BLD AUTO: 3.97 X10E6/UL (ref 3.77–5.28)
RBC #/AREA URNS HPF: NORMAL /HPF
SODIUM SERPL-SCNC: 145 MMOL/L (ref 134–144)
SP GR UR: 1.02 (ref 1–1.03)
TRIGL SERPL-MCNC: 362 MG/DL (ref 0–149)
TSH SERPL DL<=0.005 MIU/L-ACNC: 1.65 UIU/ML (ref 0.45–4.5)
UROBILINOGEN UR STRIP-MCNC: 0.2 MG/DL (ref 0.2–1)
VLDLC SERPL CALC-MCNC: 72 MG/DL (ref 5–40)
WBC # BLD AUTO: 7.6 X10E3/UL (ref 3.4–10.8)
WBC #/AREA URNS HPF: NORMAL /HPF

## 2017-07-27 NOTE — PROGRESS NOTES
Average BS much improved. Now w/i controlled range. Low Vit. D. Take OTC supp 2771-9079 units daily. Borderline anemic as before. High TG/low HDL. Take Krill oil supp.   Rest O.K.

## 2017-07-27 NOTE — TELEPHONE ENCOUNTER
----- Message from Joon Espinoza sent at 7/27/2017  6:31 PM EDT -----  Regarding: Dr. Loki Powers,    Pt is returning the practice call. Best contact number is [phone].     Thanks,  Corrina Padilla

## 2017-08-02 ENCOUNTER — OFFICE VISIT (OUTPATIENT)
Dept: FAMILY MEDICINE CLINIC | Age: 67
End: 2017-08-02

## 2017-08-02 ENCOUNTER — PATIENT OUTREACH (OUTPATIENT)
Dept: INTERNAL MEDICINE CLINIC | Age: 67
End: 2017-08-02

## 2017-08-02 VITALS
RESPIRATION RATE: 14 BRPM | WEIGHT: 185 LBS | SYSTOLIC BLOOD PRESSURE: 132 MMHG | OXYGEN SATURATION: 98 % | DIASTOLIC BLOOD PRESSURE: 78 MMHG | HEIGHT: 62 IN | BODY MASS INDEX: 34.04 KG/M2 | TEMPERATURE: 98.6 F | HEART RATE: 64 BPM

## 2017-08-02 DIAGNOSIS — L40.9 PSORIASIS: ICD-10-CM

## 2017-08-02 DIAGNOSIS — R45.86 MOOD CHANGES: ICD-10-CM

## 2017-08-02 DIAGNOSIS — Z23 ENCOUNTER FOR IMMUNIZATION: ICD-10-CM

## 2017-08-02 DIAGNOSIS — Z00.00 MEDICARE ANNUAL WELLNESS VISIT, INITIAL: Primary | ICD-10-CM

## 2017-08-02 DIAGNOSIS — R06.09 DOE (DYSPNEA ON EXERTION): ICD-10-CM

## 2017-08-02 DIAGNOSIS — Z13.39 SCREENING FOR ALCOHOLISM: ICD-10-CM

## 2017-08-02 DIAGNOSIS — Z13.31 SCREENING FOR DEPRESSION: ICD-10-CM

## 2017-08-02 DIAGNOSIS — Z79.4 CONTROLLED TYPE 2 DIABETES MELLITUS WITHOUT COMPLICATION, WITH LONG-TERM CURRENT USE OF INSULIN (HCC): ICD-10-CM

## 2017-08-02 DIAGNOSIS — E11.9 CONTROLLED TYPE 2 DIABETES MELLITUS WITHOUT COMPLICATION, WITH LONG-TERM CURRENT USE OF INSULIN (HCC): ICD-10-CM

## 2017-08-02 DIAGNOSIS — Z12.11 COLON CANCER SCREENING: Primary | ICD-10-CM

## 2017-08-02 PROBLEM — H11.30 SUBCONJUNCTIVAL HEMORRHAGE: Status: RESOLVED | Noted: 2017-03-20 | Resolved: 2017-08-02

## 2017-08-02 RX ORDER — ESCITALOPRAM OXALATE 10 MG/1
10 TABLET ORAL DAILY
COMMUNITY
Start: 2017-06-27 | End: 2017-09-20 | Stop reason: DRUGHIGH

## 2017-08-02 RX ORDER — TOLTERODINE 4 MG/1
CAPSULE, EXTENDED RELEASE ORAL
COMMUNITY
Start: 2017-05-24

## 2017-08-02 RX ORDER — HALOBETASOL PROPIONATE 0.5 MG/G
CREAM TOPICAL 2 TIMES DAILY
Qty: 15 G | Refills: 1 | Status: SHIPPED | OUTPATIENT
Start: 2017-08-02

## 2017-08-02 RX ORDER — BISMUTH SUBSALICYLATE 262 MG
1 TABLET,CHEWABLE ORAL DAILY
COMMUNITY

## 2017-08-02 RX ORDER — GLUCOSAMINE SULFATE 1500 MG
1000 POWDER IN PACKET (EA) ORAL DAILY
COMMUNITY
End: 2017-08-02 | Stop reason: ALTCHOICE

## 2017-08-02 NOTE — ASSESSMENT & PLAN NOTE
Stable, based on history, physical exam and review of pertinent labs, studies and medications; meds reconciled; continue current treatment plan. Key Psychotherapeutic Meds             QUEtiapine (SEROQUEL) 25 mg tablet  (Taking) TAKE 3 TABLETS NIGHTLY    buPROPion XL (WELLBUTRIN XL) 150 mg tablet  (Taking) TAKE 1 TABLET DAILY    escitalopram oxalate (LEXAPRO) 10 mg tablet Take 10 mg by mouth daily.  unsure of dosage she is taking    escitalopram oxalate (LEXAPRO) 20 mg tablet TAKE 1 TABLET DAILY        Lab Results   Component Value Date/Time    Sodium 145 07/26/2017 08:52 AM    Creatinine 0.86 07/26/2017 08:52 AM    Creatinine, External 1.0 at [facility] ER 08/01/2016    TSH 1.650 07/26/2017 08:52 AM    WBC 7.6 07/26/2017 08:52 AM    ALT (SGPT) 23 07/26/2017 08:52 AM    AST (SGOT) 22 07/26/2017 08:52 AM

## 2017-08-02 NOTE — ASSESSMENT & PLAN NOTE
This condition is managed by Specialist.  Key Antihyperglycemic Medications             LEVEMIR FLEXPEN 100 unit/mL (3 mL) pen  (Taking) 32 Units by SubCUTAneous route. Every morning    insulin lispro (HUMALOG) 100 unit/mL injection  (Taking) 10 Units by SubCUTAneous route three (3) times daily. metFORMIN (GLUCOPHAGE) 500 mg tablet  (Taking) TAKE 2 TABLETS DAILY WITH BREAKFAST    VICTOZA 3-ASMITA 0.6 mg/0.1 mL (18 mg/3 mL) sub-q pen 1.8 mg by SubCUTAneous route daily. Indications: NOT TAKING BECAUSE REACTION        Other Key Diabetic Medications             OMEGA-3 FATTY ACIDS (FISH OIL CONCENTRATE PO)  (Taking) Take  by mouth.         Lab Results   Component Value Date/Time    Hemoglobin A1c 6.7 07/26/2017 08:52 AM    Hemoglobin A1c, External 9.8 04/06/2016    Glucose 140 07/26/2017 08:52 AM    Creatinine 0.86 07/26/2017 08:52 AM    Creatinine, External 1.0 at [facility] 08/01/2016    Microalb/Creat ratio (ug/mg creat.) 9.4 07/26/2017 08:52 AM    Cholesterol, total 145 07/26/2017 08:52 AM    HDL Cholesterol 27 07/26/2017 08:52 AM    LDL, calculated 46 07/26/2017 08:52 AM    Triglyceride 362 07/26/2017 08:52 AM     Diabetic Foot and Eye Exam HM Status   Topic Date Due    Diabetic Foot Care  12/10/2015    Eye Exam  10/04/2017

## 2017-08-02 NOTE — PROGRESS NOTES
Breathing pb began week ago with exertion doing housework. No pb with walking or steps. 11 # weight gain since May. Visit Vitals    /78 (BP 1 Location: Right arm, BP Patient Position: Sitting)    Pulse 64    Temp 98.6 °F (37 °C) (Oral)    Resp 14    Ht 5' 1.75\" (1.568 m)    Wt 185 lb (83.9 kg)    SpO2 98%    BMI 34.11 kg/m2     Patient alert and cooperative. Reviewed above. Assessment:  1. Recent onset dyspnea with exertion with housework, not with walking or steps. Plan:  1. Will set up cardiac referral for probable stress testing as patient at increased risk with her diabetes.   2. Follow here otherwise prn.  3. Refilled med for her psoriasis, previously given by dermatologist.

## 2017-08-02 NOTE — PROGRESS NOTES
Patient's identity verified with two patient identifiers (name and date of birth). 1. Have you been to the ER, urgent care clinic since your last visit? Hospitalized since your last visit? No    2. Have you seen or consulted any other health care providers outside of the [address] [place] [address] since your last visit? Include any pap smears or colon screening. No    Chief Complaint   Patient presents with   [facility] Annual Wellness Visit     Medicare wellness    Results     lab results given    Breathing Problem     x1 wk, feels like she can't take deep breaths. \"Feels heavy on upper chest\". No coughing/lightheadedness. Not Fasting. Health Maintenance Due   Topic Date Due    OSTEOPOROSIS SCREENING (DEXA)   Due. 12/28/2013    MEDICARE YEARLY EXAM   Done today. 12/28/2013    FOOT EXAM Q1   Done today. 12/10/2015     The patient was counseled on the dangers of tobacco use, and is not a smoker. Reviewed strategies to maximize success. Complete Physical Exam Female  Pre-Visit Questions:    1. Do you follow a low fat or low salt diet ? Low salt  2. Do you follow an exercise program? Walk, daily, x30 mins  3. Have you had your tetanus booster in the last 10 years? 2012  4. Have you ever had a Pneumonia vaccine? 2014  5. Do you smoke? no  6. Do you consider yourself overweight? yes  7. Do you perform Breast self exam? Yes, periodically  8. Is there a family history of CAD< age 48? yes  5. Is there a family history of Cancer? Yes, brother  8. Do you have any Cancer risks? Yes, history of breast/lymph/stomach. 11.  Have you had a colonoscopy? yes  12. Have you been to your eye doctor past year?   yes  13. Have you been to your dentist in the last 6 months? No, needs to go   14. Have you had your flu shot for this season? Not due  15. Have you had a Pap smear in the last 3 years? No, aware  16. Have you had your annual mammogram? No, not yet  17. Have you had a bone density scan(DEXA)?  Yes, x2 yrs ago per patient. Advance Care Plan was discussed but the patient did not wish or was not able to name a surrogate decision maker or provide an [address]       Patient opts for Prevnar 13 Vaccine today in office. All questions answered, consent form signed, and VIS given. 0.5 ML given in Right deltoid without difficulty, patient tolerated well. Patient was monitored for 15 minutes after administration with no complications. LOT: [id number]  EXP: 04/2018  : Ivette jung   NDC: 4130-8468-12  SITE: Right deltoid  ROUTE: Intramuscular
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
## 2017-08-02 NOTE — ACP (ADVANCE CARE PLANNING)
Advance Care Planning    Advance Care Planning (ACP) Provider Conversation Snapshot    Date of ACP Conversation: 08/02/17  Persons included in Conversation:  patient  Length of ACP Conversation in minutes:  <16 minutes (Non-Billable)    Authorized Decision Maker (if patient is incapable of making informed decisions):    This person is:   Feliz Jacobs          For Patients with Decision Making Capacity:   Values/Goals: Exploration of values, goals, and preferences if recovery is not expected, even with continued medical treatment in the event of:  Imminent death  Severe, permanent brain injury    Conversation Outcomes / Follow-Up Plan:   Recommended completion of Advance Directive form after review of ACP materials and conversation with prospective healthcare agent

## 2017-08-02 NOTE — MR AVS SNAPSHOT
Visit Information Date & Time Provider Department Dept. Phone Encounter #  
 8/2/2017 11:00 AM Shanae Rosenberg MD [facility] [phone] [id number] Follow-up Instructions Return in about 1 year (around 8/2/2018) for [address] lab. Upcoming Health Maintenance Date Due OSTEOPOROSIS SCREENING (DEXA) 12/28/2013 MEDICARE YEARLY EXAM 12/28/2013 FOOT EXAM Q1 9/2/2017* INFLUENZA AGE 9 TO ADULT 9/11/2017* EYE EXAM RETINAL OR DILATED Q1 10/4/2017 BREAST CANCER SCRN MAMMOGRAM 12/7/2017 HEMOGLOBIN A1C Q6M 1/26/2018 MICROALBUMIN Q1 7/26/2018 LIPID PANEL Q1 7/26/2018 GLAUCOMA SCREENING Q2Y 10/4/2018 COLONOSCOPY 7/22/2021 DTaP/Tdap/Td series (2 - Td) 10/10/2022 *Topic was postponed. The date shown is not the original due date. Allergies as of 8/2/2017  Review Complete On: 8/2/2017 By: Shanae Rosenberg MD  
  
 Severity Noted Reaction Type Reactions Cefdinir High 01/12/2016    Other (comments) Arthralgias and serum sickness Advil Cold & Sinus [Pseudoephedrine-ibuprofen]  09/11/2009    Palpitations Cefzil [Cefprozil]  09/11/2009    Rash Ciprofloxacin  01/12/2016    Other (comments) arthralgias Invokana [Canagliflozin]  01/12/2016    Other (comments) Caused her to have frequent UTI's Naprosyn [Naproxen]  09/11/2009    Other (comments) Blisters in mouth Victoza [Liraglutide]  08/02/2017   Side Effect Swelling  
 joints Current Immunizations  Reviewed on 8/2/2017 Name Date IPV 5/30/2002 Influenza High Dose Vaccine PF 10/6/2015 Influenza Vaccine PF 12/17/2013 Influenza Vaccine Split 10/10/2012, 11/16/2010 Pneumococcal Conjugate (PCV-13)  Incomplete Pneumococcal Polysaccharide (PPSV-23) 6/3/2014, 6/17/2003 TD Vaccine 6/10/1997 TDAP Vaccine 10/10/2012 Zoster Vaccine, Live 4/24/2013  Reviewed by Shanae Rosenberg MD on 8/2/2017 at 11:20 AM  
You Were Diagnosed With   
  
 Codes Comments Psoriasis    -  Primary ICD-10-CM: L40.9 ICD-9-CM: 696.1 Controlled type 2 diabetes mellitus without complication, with long-term current use of insulin (HCC)     ICD-10-CM: E11.9, Z79.4 ICD-9-CM: 250.00, V58.67 Mood changes (HCC)     ICD-10-CM: F39 
ICD-9-CM: 296.90   
 BRANDON (dyspnea on exertion)     ICD-10-CM: R06.09 
ICD-9-CM: 786.09 Screening for alcoholism     ICD-10-CM: Z13.89 ICD-9-CM: V79.1 Screening for depression     ICD-10-CM: Z13.89 ICD-9-CM: V79.0 Encounter for immunization     ICD-10-CM: I51 ICD-9-CM: V03.89 Vitals BP Pulse Temp Resp Height(growth percentile) Weight(growth percentile) 132/78 (BP 1 Location: Right arm, BP Patient Position: Sitting) 64 98.6 °F (37 °C) (Oral) 14 5' 1.75\" (1.568 m) 185 lb (83.9 kg) SpO2 BMI OB Status Smoking Status 98% 34.11 kg/m2 Postmenopausal Never Smoker Vitals History BMI and BSA Data Body Mass Index Body Surface Area  
 34.11 kg/m 2 1.91 m 2 Preferred Pharmacy Pharmacy Name Phone [phone] [facility] [phone] Your Updated Medication List  
  
   
This list is accurate as of: 8/2/17 11:23 AM.  Always use your most recent med list.  
  
  
  
  
 ALOE VERA PO Take  by mouth daily. buPROPion  mg tablet Commonly known as:  WELLBUTRIN XL  
TAKE 1 TABLET DAILY  
  
 * escitalopram oxalate 10 mg tablet Commonly known as:  Jenna Bors Take 10 mg by mouth daily. unsure of dosage she is taking * escitalopram oxalate 20 mg tablet Commonly known as:  Jenna Bors TAKE 1 TABLET DAILY FISH OIL CONCENTRATE PO Take  by mouth. GARCINIA CAMBOGIA PO Take  by mouth. halobetasol 0.05 % topical cream  
Commonly known as:  Dahlia Alejandra Apply  to affected area two (2) times a day. use thin layer HumaLOG 100 unit/mL injection Generic drug:  insulin lispro 10 Units by SubCUTAneous route three (3) times daily. indapamide 2.5 mg tablet Commonly known as:  LOZOL  
TAKE 2 TABLETS EVERY MORNING  
  
 LEVEMIR FLEXPEN 100 unit/mL (3 mL) Inpn Generic drug:  insulin detemir 32 Units by SubCUTAneous route. Every morning  
  
 metFORMIN 500 mg tablet Commonly known as:  GLUCOPHAGE  
TAKE 2 TABLETS DAILY WITH BREAKFAST  
  
 metoprolol succinate 200 mg XL tablet Commonly known as:  TOPROL-XL  
TAKE 1 TABLET DAILY  
  
 multivitamin tablet Commonly known as:  ONE A DAY Take 1 Tab by mouth daily. ONETOUCH ULTRA TEST strip Generic drug:  glucose blood VI test strips  
by Does Not Apply route See Admin Instructions. ONETOUCH ULTRA2 monitoring kit Generic drug:  Blood-Glucose Meter  
by Does Not Apply route. ONETOUCH ULTRASOFT LANCETS Misc Generic drug:  Lancets  
by Does Not Apply route. OTHER  
daily. Apple cider vinegar tabs OTHER(NON-FORMULARY) \"ACTIVE MIND\" QUEtiapine 25 mg tablet Commonly known as:  SEROquel TAKE 3 TABLETS NIGHTLY  
  
 tolterodine ER 4 mg ER capsule Commonly known as:  DETROL LA  
  
 * Notice: This list has 2 medication(s) that are the same as other medications prescribed for you. Read the directions carefully, and ask your doctor or other care provider to review them with you. Prescriptions Sent to Pharmacy Refills  
 halobetasol (ULTRAVATE) 0.05 % topical cream 1 Sig: Apply  to affected area two (2) times a day. use thin layer Class: Normal  
 Pharmacy: [place] [address], [address] [facility] #: [phone] Route: Topical  
  
We Performed the Following ADMIN PNEUMOCOCCAL VACCINE [ [facility]] [facility][id number]] PNEUMOCOCCAL CONJ VACCINE 13 VALENT IM [id number] CPT(R)] REFERRAL TO CARDIOLOGY [HWL45 Custom] Comments:  
 Please evaluate patient for recent onset BRANDON.   [address]  
  
 Follow-up Instructions Return in about 1 year (around 8/2/2018) for [address] lab. Referral Information Referral ID Referred By Referred To  
  
 [id number] Rakesh John Not Available Visits Status Start Date End Date 1 New Request 8/2/17 8/2/18 If your referral has a status of pending review or denied, additional information will be sent to support the outcome of this decision. Introducing [facility]! [facility] introduces DeliverCareRx patient portal. Now you can access parts of your medical record, email your doctor's office, and request medication refills online. 1. In your internet browser, go to [email or web address] 2. Click on the First Time User? Click Here link in the Sign In box. You will see the New Member Sign Up page. 3. Enter your Del[employer] Access Code exactly as it appears below. You will not need to use this code after youve completed the sign-up process. If you do not sign up before the expiration date, you must request a new code. · Del[employer] Access Code: [email or web address] Expires: 10/31/2017 11:12 AM 
 
4. Enter the last four digits of your Social Security Number (xxxx) and Date of Birth (mm/dd/yyyy) as indicated and click Submit. You will be taken to the next sign-up page. 5. Create a DeliverCareRx ID. This will be your Deliver[facility] login ID and cannot be changed, so think of one that is secure and easy to remember. 6. Create a Del[employer] password. You can change your password at any time. 7. Enter your Password Reset Question and Answer. This can be used at a later time if you forget your password. 8. Enter your e-mail address. You will receive e-mail notification when new information is available in [address] 9. Click Sign Up. You can now view and download portions of your medical record. 10. Click the Download Summary menu link to download a portable copy of your medical information. If you have questions, please visit the Frequently Asked Questions section of the [employer] website. Remember, [employer] is NOT to be used for urgent needs. For medical emergencies, dial 911. Now available from your iPhone and Android! Please provide this summary of care documentation to your next provider. Your primary care clinician is listed as [phone] If you have any questions after today's visit, please call [phone].

## 2017-09-11 ENCOUNTER — TELEPHONE (OUTPATIENT)
Dept: FAMILY MEDICINE CLINIC | Age: 67
End: 2017-09-11

## 2017-09-11 DIAGNOSIS — L40.9 PSORIASIS: ICD-10-CM

## 2017-09-11 RX ORDER — HALOBETASOL PROPIONATE 0.5 MG/G
CREAM TOPICAL 2 TIMES DAILY
Qty: 15 G | Refills: 1 | Status: CANCELLED | OUTPATIENT
Start: 2017-09-11

## 2017-09-11 NOTE — TELEPHONE ENCOUNTER
Patient requesting a return call to discuss a new medication she would like to try. Her contact # is [phone].

## 2017-09-11 NOTE — TELEPHONE ENCOUNTER
Spoke to patient and she wanted to try alt med for psoriasis. She was hoping for [facility] [place] but advised this is not a rx Dr Bonnie Valles will prescribe. She wants to try anything else he would be willing to rx and if there is nothing other than the current Ultravate he filled in August-she is ok with that refill.  [facility]

## 2017-09-20 RX ORDER — ESCITALOPRAM OXALATE 20 MG/1
TABLET ORAL
Qty: 90 TAB | Refills: 2 | Status: SHIPPED | OUTPATIENT
Start: 2017-09-20

## 2017-09-20 NOTE — TELEPHONE ENCOUNTER
She had her lab done end of July and her [address] the beginning of August. We do not follow her for her DM

## 2017-12-16 DIAGNOSIS — G47.00 INSOMNIA, UNSPECIFIED TYPE: ICD-10-CM

## 2017-12-16 DIAGNOSIS — R45.86 MOOD CHANGES: ICD-10-CM

## 2017-12-18 NOTE — TELEPHONE ENCOUNTER
Requested Prescriptions     Pending Prescriptions Disp Refills    QUEtiapine (SEROQUEL) 25 mg tablet [Pharmacy Med Name: QUETIAPINE FUMARATE TABS 25MG] 270 Tab 1     Sig: TAKE 3 TABLETS NIGHTLY     Last Refill:6/19/17    Last Office Visit: 8/2/17  MWE    Upcoming Appointment: none    Last Labs:7/26/17

## 2017-12-19 RX ORDER — QUETIAPINE FUMARATE 25 MG/1
TABLET, FILM COATED ORAL
Qty: 270 TAB | Refills: 1 | Status: SHIPPED | OUTPATIENT
Start: 2017-12-19

## 2021-01-14 ENCOUNTER — HOSPITAL ENCOUNTER (EMERGENCY)
Age: 71
Discharge: HOME | End: 2021-01-14
Attending: EMERGENCY MEDICINE | Admitting: EMERGENCY MEDICINE

## 2021-01-14 ENCOUNTER — APPOINTMENT (OUTPATIENT)
Dept: GENERAL RADIOLOGY | Age: 71
End: 2021-01-14
Attending: EMERGENCY MEDICINE

## 2021-01-14 ENCOUNTER — APPOINTMENT (OUTPATIENT)
Dept: CT IMAGING | Age: 71
End: 2021-01-14
Attending: EMERGENCY MEDICINE

## 2021-01-14 VITALS
TEMPERATURE: 98.4 F | HEIGHT: 62 IN | RESPIRATION RATE: 14 BRPM | BODY MASS INDEX: 31.52 KG/M2 | DIASTOLIC BLOOD PRESSURE: 68 MMHG | HEART RATE: 65 BPM | OXYGEN SATURATION: 100 % | WEIGHT: 171.3 LBS | SYSTOLIC BLOOD PRESSURE: 152 MMHG

## 2021-01-14 DIAGNOSIS — E86.0 DEHYDRATION: ICD-10-CM

## 2021-01-14 DIAGNOSIS — Z20.822 SUSPECTED COVID-19 VIRUS INFECTION: Primary | ICD-10-CM

## 2021-01-14 LAB
ALBUMIN SERPL-MCNC: 3.3 G/DL (ref 3.5–5)
ALBUMIN/GLOB SERPL: 0.9 {RATIO} (ref 1.1–2.2)
ALP SERPL-CCNC: 174 U/L (ref 45–117)
ALT SERPL-CCNC: 83 U/L (ref 12–78)
ANION GAP SERPL CALC-SCNC: 16 MMOL/L (ref 5–15)
APPEARANCE UR: CLEAR
AST SERPL-CCNC: 58 U/L (ref 15–37)
BACTERIA URNS QL MICRO: ABNORMAL /HPF
BASOPHILS # BLD: 0 K/UL (ref 0–0.1)
BASOPHILS NFR BLD: 0 % (ref 0–1)
BILIRUB SERPL-MCNC: 0.3 MG/DL (ref 0.2–1)
BILIRUB UR QL: NEGATIVE
BUN SERPL-MCNC: 28 MG/DL (ref 6–20)
BUN/CREAT SERPL: 23 (ref 12–20)
CALCIUM SERPL-MCNC: 9.5 MG/DL (ref 8.5–10.1)
CHLORIDE SERPL-SCNC: 104 MMOL/L (ref 97–108)
CO2 SERPL-SCNC: 23 MMOL/L (ref 21–32)
COLOR UR: ABNORMAL
CREAT SERPL-MCNC: 1.21 MG/DL (ref 0.55–1.02)
DIFFERENTIAL METHOD BLD: NORMAL
EOSINOPHIL # BLD: 0.1 K/UL (ref 0–0.4)
EOSINOPHIL NFR BLD: 1 % (ref 0–7)
EPITH CASTS URNS QL MICRO: ABNORMAL /LPF
ERYTHROCYTE [DISTWIDTH] IN BLOOD BY AUTOMATED COUNT: 13 % (ref 11.5–14.5)
GLOBULIN SER CALC-MCNC: 3.5 G/DL (ref 2–4)
GLUCOSE BLD STRIP.AUTO-MCNC: 90 MG/DL (ref 65–100)
GLUCOSE SERPL-MCNC: 99 MG/DL (ref 65–100)
GLUCOSE UR STRIP.AUTO-MCNC: NEGATIVE MG/DL
HCT VFR BLD AUTO: 38.1 % (ref 35–47)
HGB BLD-MCNC: 12.3 G/DL (ref 11.5–16)
HGB UR QL STRIP: NEGATIVE
IMM GRANULOCYTES # BLD AUTO: 0 K/UL (ref 0–0.04)
IMM GRANULOCYTES NFR BLD AUTO: 0 % (ref 0–0.5)
KETONES UR QL STRIP.AUTO: NEGATIVE MG/DL
LACTATE SERPL-SCNC: 0.8 MMOL/L (ref 0.4–2)
LEUKOCYTE ESTERASE UR QL STRIP.AUTO: ABNORMAL
LYMPHOCYTES # BLD: 3.2 K/UL (ref 0.8–3.5)
LYMPHOCYTES NFR BLD: 32 % (ref 12–49)
MCH RBC QN AUTO: 28.8 PG (ref 26–34)
MCHC RBC AUTO-ENTMCNC: 32.3 G/DL (ref 30–36.5)
MCV RBC AUTO: 89.2 FL (ref 80–99)
MONOCYTES # BLD: 0.7 K/UL (ref 0–1)
MONOCYTES NFR BLD: 7 % (ref 5–13)
NEUTS SEG # BLD: 6.2 K/UL (ref 1.8–8)
NEUTS SEG NFR BLD: 60 % (ref 32–75)
NITRITE UR QL STRIP.AUTO: NEGATIVE
NRBC # BLD: 0 K/UL (ref 0–0.01)
NRBC BLD-RTO: 0 PER 100 WBC
PH UR STRIP: 5 [PH] (ref 5–8)
PLATELET # BLD AUTO: 213 K/UL (ref 150–400)
PMV BLD AUTO: 9.6 FL (ref 8.9–12.9)
POTASSIUM SERPL-SCNC: 4.1 MMOL/L (ref 3.5–5.1)
PROT SERPL-MCNC: 6.8 G/DL (ref 6.4–8.2)
PROT UR STRIP-MCNC: NEGATIVE MG/DL
RBC # BLD AUTO: 4.27 M/UL (ref 3.8–5.2)
RBC #/AREA URNS HPF: ABNORMAL /HPF (ref 0–5)
SERVICE CMNT-IMP: NORMAL
SODIUM SERPL-SCNC: 143 MMOL/L (ref 136–145)
SP GR UR REFRACTOMETRY: 1.02 (ref 1–1.03)
TROPONIN I SERPL-MCNC: <0.05 NG/ML
UR CULT HOLD, URHOLD: NORMAL
UROBILINOGEN UR QL STRIP.AUTO: 0.2 EU/DL (ref 0.2–1)
WBC # BLD AUTO: 10.3 K/UL (ref 3.6–11)
WBC URNS QL MICRO: ABNORMAL /HPF (ref 0–4)

## 2021-01-14 PROCEDURE — 83605 ASSAY OF LACTIC ACID: CPT

## 2021-01-14 PROCEDURE — 96374 THER/PROPH/DIAG INJ IV PUSH: CPT

## 2021-01-14 PROCEDURE — 87635 SARS-COV-2 COVID-19 AMP PRB: CPT

## 2021-01-14 PROCEDURE — 74011250636 HC RX REV CODE- 250/636: Performed by: EMERGENCY MEDICINE

## 2021-01-14 PROCEDURE — 71045 X-RAY EXAM CHEST 1 VIEW: CPT

## 2021-01-14 PROCEDURE — 85025 COMPLETE CBC W/AUTO DIFF WBC: CPT

## 2021-01-14 PROCEDURE — 80053 COMPREHEN METABOLIC PANEL: CPT

## 2021-01-14 PROCEDURE — 96361 HYDRATE IV INFUSION ADD-ON: CPT

## 2021-01-14 PROCEDURE — 70450 CT HEAD/BRAIN W/O DYE: CPT

## 2021-01-14 PROCEDURE — 82962 GLUCOSE BLOOD TEST: CPT

## 2021-01-14 PROCEDURE — 99285 EMERGENCY DEPT VISIT HI MDM: CPT

## 2021-01-14 PROCEDURE — 84484 ASSAY OF TROPONIN QUANT: CPT

## 2021-01-14 PROCEDURE — 93005 ELECTROCARDIOGRAM TRACING: CPT

## 2021-01-14 PROCEDURE — 36415 COLL VENOUS BLD VENIPUNCTURE: CPT

## 2021-01-14 PROCEDURE — 81001 URINALYSIS AUTO W/SCOPE: CPT

## 2021-01-14 RX ORDER — ONDANSETRON 2 MG/ML
4 INJECTION INTRAMUSCULAR; INTRAVENOUS
Status: COMPLETED | OUTPATIENT
Start: 2021-01-14 | End: 2021-01-14

## 2021-01-14 RX ADMIN — ONDANSETRON 4 MG: 2 INJECTION INTRAMUSCULAR; INTRAVENOUS at 18:37

## 2021-01-14 RX ADMIN — SODIUM CHLORIDE 1000 ML: 9 INJECTION, SOLUTION INTRAVENOUS at 18:39
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
## 2021-01-14 NOTE — ED TRIAGE NOTES
TRIAGE NOTE: Patient arrived from home with c/o fever, cough, weakness, N/V/D for the past couple days. Denies any exposure with covid.  +headache

## 2021-01-15 ENCOUNTER — PATIENT OUTREACH (OUTPATIENT)
Dept: CASE MANAGEMENT | Age: 71
End: 2021-01-15

## 2021-01-15 LAB
ATRIAL RATE: 65 BPM
CALCULATED P AXIS, ECG09: 34 DEGREES
CALCULATED R AXIS, ECG10: -2 DEGREES
CALCULATED T AXIS, ECG11: 10 DEGREES
DIAGNOSIS, 93000: NORMAL
P-R INTERVAL, ECG05: 158 MS
Q-T INTERVAL, ECG07: 386 MS
QRS DURATION, ECG06: 78 MS
QTC CALCULATION (BEZET), ECG08: 401 MS
VENTRICULAR RATE, ECG03: 65 BPM

## 2021-01-15 NOTE — ED NOTES
Care assumed from Dr. Cirilo Dawkins at 7 PM.  Please see his note for full H&P. Getting IV fluid bolus and pending lactic acid measure at time of signout due to slight elevated anion gap. Normal lactic acid  Concern for possible COVID-19 infection and will test at discharge, and recommended PCP f/u, and return precautions were given for worsening or concerns.

## 2021-01-15 NOTE — PROGRESS NOTES
Patient contacted regarding recent discharge and COVID-19 risk. Discussed COVID-19 related testing which was pending at this time. Test results were pending. Patient informed of results, if available? Outreach made within 2 business days of discharge: Yes    Care Transition Nurse/ Ambulatory Care Manager/ LPN Care Coordinator contacted the patient by telephone to perform post discharge assessment. Verified name and  with patient as identifiers. Patient has following risk factors of: diabetes, HTN.,CTN/ACM/LPN reviewed discharge instructions, medical action plan and red flags related to discharge diagnosis. Reviewed and educated them on any new and changed medications related to discharge diagnosis. Advised obtaining a 90-day supply of all daily and as-needed medications. Advance Care Planning:   Does patient have an Advance Directive: health care decision makers updated  Cathie Rogers-Child[facility]Home Phone: [phone]     Education provided regarding infection prevention, and signs and symptoms of COVID-19 and when to seek medical attention with patient who verbalized understanding. Discussed exposure protocols and quarantine from [address] you at higher risk for severe illness  and given an opportunity for questions and concerns. The patient agrees to contact the COVID-19 hotline [phone] or PCP office for questions related to their healthcare. CTN/ACM/LPN provided contact information for future reference. From CDC: Are you at higher risk for severe illness?  Wash your hands often.  Avoid close contact (6 feet, which is about two arm lengths) with people who are sick.  Put distance between yourself and other people if COVID-19 is spreading in your community.  Clean and disinfect frequently touched surfaces.  Avoid all cruise travel and non-essential air travel.    Call your healthcare professional if you have concerns about COVID-19 and your underlying condition or if you are sick. For more information on steps you can take to protect yourself, see CDC's How to Protect Yourself      Patient/family/caregiver given information for GetWell Loop and agrees to enroll yes  Patient's preferred e-mail:  [email or web address]  Patient's preferred phone number:[phone]  Based on Loop alert triggers, patient will be contacted by nurse care manager for worsening symptoms. Pt will be further monitored by COVID Loop Team, pending account activation by patient.  based on severity of symptoms and risk factors. Patient says she feel much better today. Everyone was very nice. She now follows up with Niki Garces MD, whom she sees as a PCP. She will follow up after receiving her results. Patient did not receive a pulse ox at discharge and denies SOB.

## 2021-01-16 LAB
COVID-19, XGCOVT: NOT DETECTED
SOURCE, COVRS: NORMAL
SPECIMEN SOURCE, FCOV2M: NORMAL
SPECIMEN TYPE, XMCV1T: NORMAL

## 2022-03-18 PROBLEM — H04.123 DRY EYES: Status: ACTIVE | Noted: 2017-03-20

## 2022-03-19 PROBLEM — L40.9 PSORIASIS: Status: ACTIVE | Noted: 2017-05-03

## 2023-05-11 RX ORDER — TOLTERODINE 4 MG/1
CAPSULE, EXTENDED RELEASE ORAL
COMMUNITY
Start: 2017-05-24

## 2023-05-11 RX ORDER — BUPROPION HYDROCHLORIDE 150 MG/1
1 TABLET ORAL DAILY
COMMUNITY
Start: 2016-11-21

## 2023-05-11 RX ORDER — METOPROLOL SUCCINATE 200 MG/1
1 TABLET, EXTENDED RELEASE ORAL DAILY
COMMUNITY
Start: 2016-03-14

## 2023-05-11 RX ORDER — ESCITALOPRAM OXALATE 20 MG/1
TABLET ORAL
COMMUNITY
Start: 2017-09-20

## 2023-05-11 RX ORDER — INSULIN LISPRO 100 [IU]/ML
INJECTION, SOLUTION INTRAVENOUS; SUBCUTANEOUS 3 TIMES DAILY
COMMUNITY

## 2023-05-11 RX ORDER — QUETIAPINE FUMARATE 25 MG/1
TABLET, FILM COATED ORAL
COMMUNITY
Start: 2017-12-19

## 2023-05-11 RX ORDER — INDAPAMIDE 2.5 MG/1
TABLET, FILM COATED ORAL
COMMUNITY
Start: 2015-10-12

## 2024-04-21 ENCOUNTER — APPOINTMENT (OUTPATIENT)
Facility: HOSPITAL | Age: 74
End: 2024-04-21
Payer: MEDICARE

## 2024-04-21 ENCOUNTER — HOSPITAL ENCOUNTER (INPATIENT)
Facility: HOSPITAL | Age: 74
LOS: 4 days | Discharge: NURSING, REHAB OR LONG-TERM CARE | End: 2024-04-25
Attending: EMERGENCY MEDICINE | Admitting: INTERNAL MEDICINE
Payer: MEDICARE

## 2024-04-21 DIAGNOSIS — S32.591A INFERIOR PUBIC RAMUS FRACTURE, RIGHT, CLOSED, INITIAL ENCOUNTER (HCC): Primary | ICD-10-CM

## 2024-04-21 DIAGNOSIS — S32.591A PUBIC RAMUS FRACTURE, RIGHT, CLOSED, INITIAL ENCOUNTER (HCC): ICD-10-CM

## 2024-04-21 LAB
ALBUMIN SERPL-MCNC: 3.6 G/DL (ref 3.5–5)
ALBUMIN/GLOB SERPL: 0.9 (ref 1.1–2.2)
ALP SERPL-CCNC: 220 U/L (ref 45–117)
ALT SERPL-CCNC: 30 U/L (ref 12–78)
ANION GAP SERPL CALC-SCNC: 1 MMOL/L (ref 5–15)
APPEARANCE UR: CLEAR
AST SERPL-CCNC: 26 U/L (ref 15–37)
BACTERIA URNS QL MICRO: NEGATIVE /HPF
BASOPHILS # BLD: 0 K/UL (ref 0–0.1)
BASOPHILS NFR BLD: 0 % (ref 0–1)
BILIRUB SERPL-MCNC: 0.4 MG/DL (ref 0.2–1)
BILIRUB UR QL: NEGATIVE
BUN SERPL-MCNC: 39 MG/DL (ref 6–20)
BUN/CREAT SERPL: 25 (ref 12–20)
CALCIUM SERPL-MCNC: 9.4 MG/DL (ref 8.5–10.1)
CHLORIDE SERPL-SCNC: 115 MMOL/L (ref 97–108)
CK SERPL-CCNC: 82 U/L (ref 26–192)
CO2 SERPL-SCNC: 23 MMOL/L (ref 21–32)
COLOR UR: ABNORMAL
COMMENT:: NORMAL
CREAT SERPL-MCNC: 1.59 MG/DL (ref 0.55–1.02)
DIFFERENTIAL METHOD BLD: ABNORMAL
EKG ATRIAL RATE: 71 BPM
EKG DIAGNOSIS: NORMAL
EKG P AXIS: 49 DEGREES
EKG P-R INTERVAL: 182 MS
EKG Q-T INTERVAL: 372 MS
EKG QRS DURATION: 70 MS
EKG QTC CALCULATION (BAZETT): 404 MS
EKG R AXIS: 10 DEGREES
EKG T AXIS: 39 DEGREES
EKG VENTRICULAR RATE: 71 BPM
EOSINOPHIL # BLD: 0.1 K/UL (ref 0–0.4)
EOSINOPHIL NFR BLD: 1 % (ref 0–7)
EPITH CASTS URNS QL MICRO: ABNORMAL /LPF
ERYTHROCYTE [DISTWIDTH] IN BLOOD BY AUTOMATED COUNT: 14.5 % (ref 11.5–14.5)
EST. AVERAGE GLUCOSE BLD GHB EST-MCNC: 123 MG/DL
GLOBULIN SER CALC-MCNC: 4 G/DL (ref 2–4)
GLUCOSE BLD STRIP.AUTO-MCNC: 103 MG/DL (ref 65–117)
GLUCOSE BLD STRIP.AUTO-MCNC: 98 MG/DL (ref 65–117)
GLUCOSE SERPL-MCNC: 158 MG/DL (ref 65–100)
GLUCOSE UR STRIP.AUTO-MCNC: NEGATIVE MG/DL
HBA1C MFR BLD: 5.9 % (ref 4–5.6)
HCT VFR BLD AUTO: 35.6 % (ref 35–47)
HGB BLD-MCNC: 10.8 G/DL (ref 11.5–16)
HGB UR QL STRIP: NEGATIVE
IMM GRANULOCYTES # BLD AUTO: 0 K/UL (ref 0–0.04)
IMM GRANULOCYTES NFR BLD AUTO: 0 % (ref 0–0.5)
KETONES UR QL STRIP.AUTO: NEGATIVE MG/DL
LEUKOCYTE ESTERASE UR QL STRIP.AUTO: ABNORMAL
LIPASE SERPL-CCNC: 51 U/L (ref 13–75)
LYMPHOCYTES # BLD: 0.9 K/UL (ref 0.8–3.5)
LYMPHOCYTES NFR BLD: 10 % (ref 12–49)
MCH RBC QN AUTO: 28.2 PG (ref 26–34)
MCHC RBC AUTO-ENTMCNC: 30.3 G/DL (ref 30–36.5)
MCV RBC AUTO: 93 FL (ref 80–99)
MONOCYTES # BLD: 0.4 K/UL (ref 0–1)
MONOCYTES NFR BLD: 4 % (ref 5–13)
NEUTS SEG # BLD: 7.4 K/UL (ref 1.8–8)
NEUTS SEG NFR BLD: 85 % (ref 32–75)
NITRITE UR QL STRIP.AUTO: NEGATIVE
NRBC # BLD: 0 K/UL (ref 0–0.01)
NRBC BLD-RTO: 0 PER 100 WBC
PH UR STRIP: 5 (ref 5–8)
PLATELET # BLD AUTO: 190 K/UL (ref 150–400)
PMV BLD AUTO: 10.2 FL (ref 8.9–12.9)
POTASSIUM SERPL-SCNC: 4.2 MMOL/L (ref 3.5–5.1)
PROT SERPL-MCNC: 7.6 G/DL (ref 6.4–8.2)
PROT UR STRIP-MCNC: ABNORMAL MG/DL
RBC # BLD AUTO: 3.83 M/UL (ref 3.8–5.2)
RBC #/AREA URNS HPF: ABNORMAL /HPF (ref 0–5)
SERVICE CMNT-IMP: NORMAL
SERVICE CMNT-IMP: NORMAL
SODIUM SERPL-SCNC: 139 MMOL/L (ref 136–145)
SP GR UR REFRACTOMETRY: 1.01 (ref 1–1.03)
SPECIMEN HOLD: NORMAL
SPECIMEN HOLD: NORMAL
TROPONIN I SERPL HS-MCNC: 6 NG/L (ref 0–51)
UROBILINOGEN UR QL STRIP.AUTO: 0.2 EU/DL (ref 0.2–1)
WBC # BLD AUTO: 8.8 K/UL (ref 3.6–11)
WBC URNS QL MICRO: ABNORMAL /HPF (ref 0–4)

## 2024-04-21 PROCEDURE — 84484 ASSAY OF TROPONIN QUANT: CPT

## 2024-04-21 PROCEDURE — 6360000002 HC RX W HCPCS: Performed by: EMERGENCY MEDICINE

## 2024-04-21 PROCEDURE — 83690 ASSAY OF LIPASE: CPT

## 2024-04-21 PROCEDURE — 73521 X-RAY EXAM HIPS BI 2 VIEWS: CPT

## 2024-04-21 PROCEDURE — 73030 X-RAY EXAM OF SHOULDER: CPT

## 2024-04-21 PROCEDURE — 2580000003 HC RX 258: Performed by: EMERGENCY MEDICINE

## 2024-04-21 PROCEDURE — 82962 GLUCOSE BLOOD TEST: CPT

## 2024-04-21 PROCEDURE — 96374 THER/PROPH/DIAG INJ IV PUSH: CPT

## 2024-04-21 PROCEDURE — 83036 HEMOGLOBIN GLYCOSYLATED A1C: CPT

## 2024-04-21 PROCEDURE — 6370000000 HC RX 637 (ALT 250 FOR IP): Performed by: INTERNAL MEDICINE

## 2024-04-21 PROCEDURE — 82550 ASSAY OF CK (CPK): CPT

## 2024-04-21 PROCEDURE — 1100000000 HC RM PRIVATE

## 2024-04-21 PROCEDURE — 6370000000 HC RX 637 (ALT 250 FOR IP): Performed by: EMERGENCY MEDICINE

## 2024-04-21 PROCEDURE — 36415 COLL VENOUS BLD VENIPUNCTURE: CPT

## 2024-04-21 PROCEDURE — 93005 ELECTROCARDIOGRAM TRACING: CPT | Performed by: EMERGENCY MEDICINE

## 2024-04-21 PROCEDURE — 6360000002 HC RX W HCPCS: Performed by: INTERNAL MEDICINE

## 2024-04-21 PROCEDURE — 74176 CT ABD & PELVIS W/O CONTRAST: CPT

## 2024-04-21 PROCEDURE — 81001 URINALYSIS AUTO W/SCOPE: CPT

## 2024-04-21 PROCEDURE — 80053 COMPREHEN METABOLIC PANEL: CPT

## 2024-04-21 PROCEDURE — 2580000003 HC RX 258: Performed by: INTERNAL MEDICINE

## 2024-04-21 PROCEDURE — 85025 COMPLETE CBC W/AUTO DIFF WBC: CPT

## 2024-04-21 PROCEDURE — 96376 TX/PRO/DX INJ SAME DRUG ADON: CPT

## 2024-04-21 PROCEDURE — 99285 EMERGENCY DEPT VISIT HI MDM: CPT

## 2024-04-21 PROCEDURE — 71045 X-RAY EXAM CHEST 1 VIEW: CPT

## 2024-04-21 PROCEDURE — 73502 X-RAY EXAM HIP UNI 2-3 VIEWS: CPT

## 2024-04-21 PROCEDURE — 96375 TX/PRO/DX INJ NEW DRUG ADDON: CPT

## 2024-04-21 RX ORDER — BUPROPION HYDROCHLORIDE 300 MG/1
300 TABLET ORAL DAILY
Status: DISCONTINUED | OUTPATIENT
Start: 2024-04-22 | End: 2024-04-25 | Stop reason: HOSPADM

## 2024-04-21 RX ORDER — SODIUM CHLORIDE 0.9 % (FLUSH) 0.9 %
5-40 SYRINGE (ML) INJECTION PRN
Status: DISCONTINUED | OUTPATIENT
Start: 2024-04-21 | End: 2024-04-25 | Stop reason: HOSPADM

## 2024-04-21 RX ORDER — 0.9 % SODIUM CHLORIDE 0.9 %
1000 INTRAVENOUS SOLUTION INTRAVENOUS ONCE
Status: COMPLETED | OUTPATIENT
Start: 2024-04-21 | End: 2024-04-21

## 2024-04-21 RX ORDER — INSULIN LISPRO 100 [IU]/ML
3 INJECTION, SOLUTION INTRAVENOUS; SUBCUTANEOUS
Status: DISCONTINUED | OUTPATIENT
Start: 2024-04-21 | End: 2024-04-25 | Stop reason: HOSPADM

## 2024-04-21 RX ORDER — ACETAMINOPHEN 500 MG
1000 TABLET ORAL
Status: COMPLETED | OUTPATIENT
Start: 2024-04-21 | End: 2024-04-21

## 2024-04-21 RX ORDER — ESCITALOPRAM OXALATE 10 MG/1
10 TABLET ORAL DAILY
Status: DISCONTINUED | OUTPATIENT
Start: 2024-04-22 | End: 2024-04-25 | Stop reason: HOSPADM

## 2024-04-21 RX ORDER — MORPHINE SULFATE 2 MG/ML
2 INJECTION, SOLUTION INTRAMUSCULAR; INTRAVENOUS EVERY 4 HOURS PRN
Status: DISCONTINUED | OUTPATIENT
Start: 2024-04-21 | End: 2024-04-25 | Stop reason: HOSPADM

## 2024-04-21 RX ORDER — INSULIN LISPRO 100 [IU]/ML
0-4 INJECTION, SOLUTION INTRAVENOUS; SUBCUTANEOUS
Status: DISCONTINUED | OUTPATIENT
Start: 2024-04-21 | End: 2024-04-25 | Stop reason: HOSPADM

## 2024-04-21 RX ORDER — ENOXAPARIN SODIUM 100 MG/ML
30 INJECTION SUBCUTANEOUS DAILY
Status: DISCONTINUED | OUTPATIENT
Start: 2024-04-21 | End: 2024-04-22

## 2024-04-21 RX ORDER — NALOXONE HYDROCHLORIDE 0.4 MG/ML
0.4 INJECTION, SOLUTION INTRAMUSCULAR; INTRAVENOUS; SUBCUTANEOUS PRN
Status: DISCONTINUED | OUTPATIENT
Start: 2024-04-21 | End: 2024-04-25 | Stop reason: HOSPADM

## 2024-04-21 RX ORDER — TROSPIUM CHLORIDE 20 MG/1
20 TABLET, FILM COATED ORAL NIGHTLY
Status: DISCONTINUED | OUTPATIENT
Start: 2024-04-21 | End: 2024-04-21

## 2024-04-21 RX ORDER — OXYCODONE HYDROCHLORIDE 5 MG/1
5 TABLET ORAL EVERY 4 HOURS PRN
Status: DISCONTINUED | OUTPATIENT
Start: 2024-04-21 | End: 2024-04-22

## 2024-04-21 RX ORDER — INSULIN LISPRO 100 [IU]/ML
0-4 INJECTION, SOLUTION INTRAVENOUS; SUBCUTANEOUS NIGHTLY
Status: DISCONTINUED | OUTPATIENT
Start: 2024-04-21 | End: 2024-04-25 | Stop reason: HOSPADM

## 2024-04-21 RX ORDER — ONDANSETRON 2 MG/ML
4 INJECTION INTRAMUSCULAR; INTRAVENOUS EVERY 6 HOURS PRN
Status: DISCONTINUED | OUTPATIENT
Start: 2024-04-21 | End: 2024-04-25 | Stop reason: HOSPADM

## 2024-04-21 RX ORDER — FENTANYL CITRATE 50 UG/ML
50 INJECTION, SOLUTION INTRAMUSCULAR; INTRAVENOUS
Status: COMPLETED | OUTPATIENT
Start: 2024-04-21 | End: 2024-04-21

## 2024-04-21 RX ORDER — DEXTROSE MONOHYDRATE 100 MG/ML
INJECTION, SOLUTION INTRAVENOUS CONTINUOUS PRN
Status: DISCONTINUED | OUTPATIENT
Start: 2024-04-21 | End: 2024-04-25 | Stop reason: HOSPADM

## 2024-04-21 RX ORDER — METOPROLOL SUCCINATE 50 MG/1
200 TABLET, EXTENDED RELEASE ORAL DAILY
Status: DISCONTINUED | OUTPATIENT
Start: 2024-04-21 | End: 2024-04-21

## 2024-04-21 RX ORDER — INSULIN GLARGINE 100 [IU]/ML
8 INJECTION, SOLUTION SUBCUTANEOUS NIGHTLY
Status: DISCONTINUED | OUTPATIENT
Start: 2024-04-21 | End: 2024-04-25 | Stop reason: HOSPADM

## 2024-04-21 RX ORDER — SENNA AND DOCUSATE SODIUM 50; 8.6 MG/1; MG/1
1 TABLET, FILM COATED ORAL 2 TIMES DAILY
Status: DISCONTINUED | OUTPATIENT
Start: 2024-04-21 | End: 2024-04-25 | Stop reason: HOSPADM

## 2024-04-21 RX ORDER — ONDANSETRON 4 MG/1
4 TABLET, ORALLY DISINTEGRATING ORAL EVERY 8 HOURS PRN
Status: DISCONTINUED | OUTPATIENT
Start: 2024-04-21 | End: 2024-04-25 | Stop reason: HOSPADM

## 2024-04-21 RX ORDER — SODIUM CHLORIDE 0.9 % (FLUSH) 0.9 %
5-40 SYRINGE (ML) INJECTION EVERY 12 HOURS SCHEDULED
Status: DISCONTINUED | OUTPATIENT
Start: 2024-04-21 | End: 2024-04-25 | Stop reason: HOSPADM

## 2024-04-21 RX ORDER — QUETIAPINE FUMARATE 25 MG/1
75 TABLET, FILM COATED ORAL NIGHTLY
Status: DISCONTINUED | OUTPATIENT
Start: 2024-04-21 | End: 2024-04-21

## 2024-04-21 RX ORDER — SODIUM CHLORIDE 9 MG/ML
INJECTION, SOLUTION INTRAVENOUS PRN
Status: DISCONTINUED | OUTPATIENT
Start: 2024-04-21 | End: 2024-04-25 | Stop reason: HOSPADM

## 2024-04-21 RX ORDER — ESCITALOPRAM OXALATE 10 MG/1
20 TABLET ORAL DAILY
Status: DISCONTINUED | OUTPATIENT
Start: 2024-04-21 | End: 2024-04-21 | Stop reason: DRUGHIGH

## 2024-04-21 RX ORDER — POLYETHYLENE GLYCOL 3350 17 G/17G
17 POWDER, FOR SOLUTION ORAL DAILY PRN
Status: DISCONTINUED | OUTPATIENT
Start: 2024-04-21 | End: 2024-04-25 | Stop reason: HOSPADM

## 2024-04-21 RX ORDER — BUPROPION HYDROCHLORIDE 150 MG/1
150 TABLET ORAL DAILY
Status: DISCONTINUED | OUTPATIENT
Start: 2024-04-21 | End: 2024-04-21 | Stop reason: DRUGHIGH

## 2024-04-21 RX ORDER — ONDANSETRON 2 MG/ML
4 INJECTION INTRAMUSCULAR; INTRAVENOUS
Status: COMPLETED | OUTPATIENT
Start: 2024-04-21 | End: 2024-04-21

## 2024-04-21 RX ADMIN — SENNOSIDES AND DOCUSATE SODIUM 1 TABLET: 8.6; 5 TABLET ORAL at 14:05

## 2024-04-21 RX ADMIN — OXYCODONE 5 MG: 5 TABLET ORAL at 20:56

## 2024-04-21 RX ADMIN — OXYCODONE 5 MG: 5 TABLET ORAL at 16:18

## 2024-04-21 RX ADMIN — METOPROLOL TARTRATE 25 MG: 25 TABLET, FILM COATED ORAL at 20:24

## 2024-04-21 RX ADMIN — ONDANSETRON 4 MG: 2 INJECTION INTRAMUSCULAR; INTRAVENOUS at 10:48

## 2024-04-21 RX ADMIN — FENTANYL CITRATE 50 MCG: 0.05 INJECTION, SOLUTION INTRAMUSCULAR; INTRAVENOUS at 12:22

## 2024-04-21 RX ADMIN — FENTANYL CITRATE 50 MCG: 0.05 INJECTION, SOLUTION INTRAMUSCULAR; INTRAVENOUS at 14:03

## 2024-04-21 RX ADMIN — SODIUM CHLORIDE, PRESERVATIVE FREE 10 ML: 5 INJECTION INTRAVENOUS at 20:48

## 2024-04-21 RX ADMIN — FENTANYL CITRATE 50 MCG: 0.05 INJECTION, SOLUTION INTRAMUSCULAR; INTRAVENOUS at 10:49

## 2024-04-21 RX ADMIN — SODIUM CHLORIDE 1000 ML: 9 INJECTION, SOLUTION INTRAVENOUS at 10:50

## 2024-04-21 RX ADMIN — ACETAMINOPHEN 1000 MG: 500 TABLET ORAL at 12:53

## 2024-04-21 RX ADMIN — SENNOSIDES AND DOCUSATE SODIUM 1 TABLET: 8.6; 5 TABLET ORAL at 20:24

## 2024-04-21 RX ADMIN — ONDANSETRON 4 MG: 2 INJECTION INTRAMUSCULAR; INTRAVENOUS at 12:22

## 2024-04-21 RX ADMIN — ENOXAPARIN SODIUM 30 MG: 100 INJECTION SUBCUTANEOUS at 16:46

## 2024-04-21 ASSESSMENT — PAIN - FUNCTIONAL ASSESSMENT
PAIN_FUNCTIONAL_ASSESSMENT: ACTIVITIES ARE NOT PREVENTED
PAIN_FUNCTIONAL_ASSESSMENT: 0-10
PAIN_FUNCTIONAL_ASSESSMENT: INTOLERABLE, UNABLE TO DO ANY ACTIVE OR PASSIVE ACTIVITIES
PAIN_FUNCTIONAL_ASSESSMENT: PREVENTS OR INTERFERES SOME ACTIVE ACTIVITIES AND ADLS
PAIN_FUNCTIONAL_ASSESSMENT: PREVENTS OR INTERFERES SOME ACTIVE ACTIVITIES AND ADLS
PAIN_FUNCTIONAL_ASSESSMENT: PREVENTS OR INTERFERES WITH ALL ACTIVE AND SOME PASSIVE ACTIVITIES

## 2024-04-21 ASSESSMENT — LIFESTYLE VARIABLES
HOW MANY STANDARD DRINKS CONTAINING ALCOHOL DO YOU HAVE ON A TYPICAL DAY: PATIENT DOES NOT DRINK
HOW OFTEN DO YOU HAVE A DRINK CONTAINING ALCOHOL: NEVER

## 2024-04-21 ASSESSMENT — PAIN DESCRIPTION - ORIENTATION
ORIENTATION: RIGHT

## 2024-04-21 ASSESSMENT — PAIN DESCRIPTION - DESCRIPTORS
DESCRIPTORS: ACHING
DESCRIPTORS: SHARP;SHOOTING
DESCRIPTORS: HEAVINESS;ACHING
DESCRIPTORS: ACHING;SPASM;DISCOMFORT
DESCRIPTORS: POUNDING;JABBING
DESCRIPTORS: ACHING;SPASM;DISCOMFORT

## 2024-04-21 ASSESSMENT — PAIN SCALES - GENERAL
PAINLEVEL_OUTOF10: 10
PAINLEVEL_OUTOF10: 10
PAINLEVEL_OUTOF10: 8
PAINLEVEL_OUTOF10: 10
PAINLEVEL_OUTOF10: 10
PAINLEVEL_OUTOF10: 9
PAINLEVEL_OUTOF10: 9
PAINLEVEL_OUTOF10: 10

## 2024-04-21 ASSESSMENT — PAIN DESCRIPTION - LOCATION
LOCATION: HIP

## 2024-04-21 ASSESSMENT — PAIN DESCRIPTION - FREQUENCY: FREQUENCY: CONTINUOUS

## 2024-04-21 ASSESSMENT — PAIN DESCRIPTION - PAIN TYPE
TYPE: ACUTE PAIN
TYPE: ACUTE PAIN

## 2024-04-21 ASSESSMENT — PAIN DESCRIPTION - ONSET: ONSET: ON-GOING
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
## 2024-04-21 NOTE — ED TRIAGE NOTES
Patient arrives by EMS from home for a GLF. Patient reports feeling dizzy and falling, states this has happened in the past. Unknown LOC, she did hit her head, denies blood thinners. Patient reports R hip pain 10/10. AOX4 on arrival to ED, .
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
## 2024-04-21 NOTE — PROGRESS NOTES
Lovenox Monitoring  Indication: DVT Prophylaxis  Recent Labs     04/21/24  0914   HGB 10.8*        Current Weight: 70.3 kg  Est. CrCl = 27 mL/min  Current Dose: 40 mg subcutaneously every 24 hours.  Plan: Change to 30 mg subcutaneously every 24 hours with respect to indication, patient weight (.9 kg), and renal function (CrCl 15-29.9 mL/min) per P&T-approved Enoxaparin Dosing, Rounding, and Dispensing Guidelines. Pharmacy will continue to monitor this patient daily for changes in clinical status and renal function.

## 2024-04-21 NOTE — ED PROVIDER NOTES
hip with x-ray so CT will be performed through the hips to further evaluate but may require MRI if no clear findings due to degree of pain. No significant hematologic or metabolic abnormalities to explain symptoms.     CT does show right inferior pubic ramus fracture.  Weightbearing as tolerated recommended by orthopedics. Will continue pain control meds and work with PT/OT to determine safe disposition given new physical limitations and pain control needs.     Problems Addressed:  Inferior pubic ramus fracture, right, closed, initial encounter (HCC): acute illness or injury    Amount and/or Complexity of Data Reviewed  Independent Historian: EMS  Labs: ordered. Decision-making details documented in ED Course.  Radiology: ordered and independent interpretation performed. Decision-making details documented in ED Course.  ECG/medicine tests: ordered and independent interpretation performed. Decision-making details documented in ED Course.    Risk  OTC drugs.  Prescription drug management.  Parenteral controlled substances.  Decision regarding hospitalization.            REASSESSMENT     ED Course as of 04/21/24 1245   Sun Apr 21, 2024   0925 EKG 0915: Rate 71, NSR. Low voltage. No ST segment or T wave abnormalities.  [DK]      ED Course User Index  [DK] Phan Rosa MD           CONSULTS:  IP CONSULT TO ORTHOPEDIC SURGERY    PROCEDURES:  Unless otherwise noted below, none     Procedures      FINAL IMPRESSION      1. Inferior pubic ramus fracture, right, closed, initial encounter (HCC)          DISPOSITION/PLAN   DISPOSITION Decision To Admit 04/21/2024 12:38:24 PM    Perfect Serve Consult for Admission  12:45 PM    ED Room Number: ER12/12  Patient Name and age:  Erin Galindo 73 y.o.  female  Working Diagnosis:   1. Inferior pubic ramus fracture, right, closed, initial encounter (HCC)        COVID-19 Suspicion: No  Sepsis present:  No  Reassessment needed: No  Code Status:  Full Code  Readmission:

## 2024-04-22 LAB
25(OH)D3 SERPL-MCNC: 26.2 NG/ML (ref 30–100)
ALBUMIN SERPL-MCNC: 3 G/DL (ref 3.5–5)
ALBUMIN/GLOB SERPL: 0.9 (ref 1.1–2.2)
ALP SERPL-CCNC: 182 U/L (ref 45–117)
ALT SERPL-CCNC: 24 U/L (ref 12–78)
ANION GAP SERPL CALC-SCNC: 1 MMOL/L (ref 5–15)
AST SERPL-CCNC: 25 U/L (ref 15–37)
BASOPHILS # BLD: 0 K/UL (ref 0–0.1)
BASOPHILS NFR BLD: 0 % (ref 0–1)
BILIRUB SERPL-MCNC: 0.4 MG/DL (ref 0.2–1)
BUN SERPL-MCNC: 33 MG/DL (ref 6–20)
BUN/CREAT SERPL: 23 (ref 12–20)
CALCIUM SERPL-MCNC: 8.9 MG/DL (ref 8.5–10.1)
CHLORIDE SERPL-SCNC: 118 MMOL/L (ref 97–108)
CO2 SERPL-SCNC: 23 MMOL/L (ref 21–32)
CREAT SERPL-MCNC: 1.44 MG/DL (ref 0.55–1.02)
DIFFERENTIAL METHOD BLD: ABNORMAL
EOSINOPHIL # BLD: 0.1 K/UL (ref 0–0.4)
EOSINOPHIL NFR BLD: 2 % (ref 0–7)
ERYTHROCYTE [DISTWIDTH] IN BLOOD BY AUTOMATED COUNT: 14.4 % (ref 11.5–14.5)
GLOBULIN SER CALC-MCNC: 3.3 G/DL (ref 2–4)
GLUCOSE BLD STRIP.AUTO-MCNC: 114 MG/DL (ref 65–117)
GLUCOSE BLD STRIP.AUTO-MCNC: 119 MG/DL (ref 65–117)
GLUCOSE BLD STRIP.AUTO-MCNC: 173 MG/DL (ref 65–117)
GLUCOSE BLD STRIP.AUTO-MCNC: 176 MG/DL (ref 65–117)
GLUCOSE BLD STRIP.AUTO-MCNC: 82 MG/DL (ref 65–117)
GLUCOSE SERPL-MCNC: 183 MG/DL (ref 65–100)
HCT VFR BLD AUTO: 30.8 % (ref 35–47)
HGB BLD-MCNC: 9.5 G/DL (ref 11.5–16)
IMM GRANULOCYTES # BLD AUTO: 0 K/UL (ref 0–0.04)
IMM GRANULOCYTES NFR BLD AUTO: 0 % (ref 0–0.5)
LYMPHOCYTES # BLD: 1.2 K/UL (ref 0.8–3.5)
LYMPHOCYTES NFR BLD: 22 % (ref 12–49)
MCH RBC QN AUTO: 28.1 PG (ref 26–34)
MCHC RBC AUTO-ENTMCNC: 30.8 G/DL (ref 30–36.5)
MCV RBC AUTO: 91.1 FL (ref 80–99)
MONOCYTES # BLD: 0.4 K/UL (ref 0–1)
MONOCYTES NFR BLD: 8 % (ref 5–13)
NEUTS SEG # BLD: 3.6 K/UL (ref 1.8–8)
NEUTS SEG NFR BLD: 68 % (ref 32–75)
NRBC # BLD: 0 K/UL (ref 0–0.01)
NRBC BLD-RTO: 0 PER 100 WBC
PLATELET # BLD AUTO: 154 K/UL (ref 150–400)
PMV BLD AUTO: 9.8 FL (ref 8.9–12.9)
POTASSIUM SERPL-SCNC: 4.6 MMOL/L (ref 3.5–5.1)
PROT SERPL-MCNC: 6.3 G/DL (ref 6.4–8.2)
RBC # BLD AUTO: 3.38 M/UL (ref 3.8–5.2)
SERVICE CMNT-IMP: ABNORMAL
SERVICE CMNT-IMP: NORMAL
SERVICE CMNT-IMP: NORMAL
SODIUM SERPL-SCNC: 142 MMOL/L (ref 136–145)
WBC # BLD AUTO: 5.3 K/UL (ref 3.6–11)

## 2024-04-22 PROCEDURE — 80053 COMPREHEN METABOLIC PANEL: CPT

## 2024-04-22 PROCEDURE — 97161 PT EVAL LOW COMPLEX 20 MIN: CPT

## 2024-04-22 PROCEDURE — 97165 OT EVAL LOW COMPLEX 30 MIN: CPT

## 2024-04-22 PROCEDURE — 82306 VITAMIN D 25 HYDROXY: CPT

## 2024-04-22 PROCEDURE — 97530 THERAPEUTIC ACTIVITIES: CPT

## 2024-04-22 PROCEDURE — 6370000000 HC RX 637 (ALT 250 FOR IP): Performed by: INTERNAL MEDICINE

## 2024-04-22 PROCEDURE — 6370000000 HC RX 637 (ALT 250 FOR IP)

## 2024-04-22 PROCEDURE — 2580000003 HC RX 258: Performed by: INTERNAL MEDICINE

## 2024-04-22 PROCEDURE — 85025 COMPLETE CBC W/AUTO DIFF WBC: CPT

## 2024-04-22 PROCEDURE — 99222 1ST HOSP IP/OBS MODERATE 55: CPT | Performed by: PHYSICIAN ASSISTANT

## 2024-04-22 PROCEDURE — 82962 GLUCOSE BLOOD TEST: CPT

## 2024-04-22 PROCEDURE — 36415 COLL VENOUS BLD VENIPUNCTURE: CPT

## 2024-04-22 PROCEDURE — 6360000002 HC RX W HCPCS: Performed by: INTERNAL MEDICINE

## 2024-04-22 PROCEDURE — 1100000000 HC RM PRIVATE

## 2024-04-22 RX ORDER — ENOXAPARIN SODIUM 100 MG/ML
40 INJECTION SUBCUTANEOUS DAILY
Status: DISCONTINUED | OUTPATIENT
Start: 2024-04-23 | End: 2024-04-24

## 2024-04-22 RX ORDER — TRAMADOL HYDROCHLORIDE 50 MG/1
50 TABLET ORAL EVERY 6 HOURS PRN
Status: DISCONTINUED | OUTPATIENT
Start: 2024-04-22 | End: 2024-04-25 | Stop reason: HOSPADM

## 2024-04-22 RX ADMIN — SENNOSIDES AND DOCUSATE SODIUM 1 TABLET: 8.6; 5 TABLET ORAL at 20:36

## 2024-04-22 RX ADMIN — TRAMADOL HYDROCHLORIDE 50 MG: 50 TABLET, COATED ORAL at 20:35

## 2024-04-22 RX ADMIN — ENOXAPARIN SODIUM 30 MG: 100 INJECTION SUBCUTANEOUS at 08:58

## 2024-04-22 RX ADMIN — SODIUM CHLORIDE, PRESERVATIVE FREE 10 ML: 5 INJECTION INTRAVENOUS at 21:21

## 2024-04-22 RX ADMIN — OXYCODONE 5 MG: 5 TABLET ORAL at 11:13

## 2024-04-22 RX ADMIN — ESCITALOPRAM OXALATE 10 MG: 10 TABLET ORAL at 08:58

## 2024-04-22 RX ADMIN — TRAMADOL HYDROCHLORIDE 50 MG: 50 TABLET, COATED ORAL at 15:21

## 2024-04-22 RX ADMIN — INSULIN GLARGINE 8 UNITS: 100 INJECTION, SOLUTION SUBCUTANEOUS at 20:41

## 2024-04-22 RX ADMIN — METOPROLOL TARTRATE 25 MG: 25 TABLET, FILM COATED ORAL at 20:36

## 2024-04-22 RX ADMIN — OXYCODONE 5 MG: 5 TABLET ORAL at 06:31

## 2024-04-22 RX ADMIN — INSULIN LISPRO 3 UNITS: 100 INJECTION, SOLUTION INTRAVENOUS; SUBCUTANEOUS at 17:27

## 2024-04-22 RX ADMIN — SODIUM CHLORIDE, PRESERVATIVE FREE 10 ML: 5 INJECTION INTRAVENOUS at 08:58

## 2024-04-22 RX ADMIN — METOPROLOL TARTRATE 25 MG: 25 TABLET, FILM COATED ORAL at 08:58

## 2024-04-22 RX ADMIN — BUPROPION HYDROCHLORIDE 300 MG: 300 TABLET, EXTENDED RELEASE ORAL at 08:58

## 2024-04-22 ASSESSMENT — PAIN DESCRIPTION - ORIENTATION
ORIENTATION: RIGHT

## 2024-04-22 ASSESSMENT — PAIN DESCRIPTION - DESCRIPTORS
DESCRIPTORS: ACHING

## 2024-04-22 ASSESSMENT — PAIN DESCRIPTION - PAIN TYPE: TYPE: ACUTE PAIN

## 2024-04-22 ASSESSMENT — PAIN SCALES - GENERAL
PAINLEVEL_OUTOF10: 8
PAINLEVEL_OUTOF10: 6
PAINLEVEL_OUTOF10: 8
PAINLEVEL_OUTOF10: 8

## 2024-04-22 ASSESSMENT — PAIN - FUNCTIONAL ASSESSMENT: PAIN_FUNCTIONAL_ASSESSMENT: PREVENTS OR INTERFERES SOME ACTIVE ACTIVITIES AND ADLS

## 2024-04-22 ASSESSMENT — PAIN DESCRIPTION - LOCATION
LOCATION: LEG
LOCATION: LEG
LOCATION: LEG;PERINEUM
LOCATION: HIP

## 2024-04-22 ASSESSMENT — PAIN DESCRIPTION - FREQUENCY: FREQUENCY: CONTINUOUS

## 2024-04-22 NOTE — CONSULTS
ORTHOPAEDIC CONSULT NOTE    Subjective:     Date of Consultation:  April 22, 2024  Referring Physician:  Nabil LEWIS Josie is a 73 y.o. female with past medical history significant for breast cancer, chronic kidney disease, diabetes, hypertension is seen for right-sided lower extremity pain following a ground-level fall yesterday.  Patient states that she has had at least a 2-year history of dizziness and falls associated with quick changes of position or rapid movements.  She states this started after a fall on ice after which she sustained some form of head injury and had difficulty speaking.  She states that she has not been following with a neurologist for this.  She states that she got up to move in her house and is unsure certain of the circumstances but fell to the ground and was unable to get up and stand on her own.  She was eventually able to mobilize to a phone to call for ambulance.  She was brought to the emergency department and found to have a right-sided inferior pubic ramus fracture.  She states that this time she has pain primarily in her groin with any movement and it is difficult for her to self mobilize the leg.  She describes some mild pain in the knee.  She denies any open injuries.  She denies any tingling or numbness.  Patient states she lives alone and uses a rollator primarily for mobilization    Patient Active Problem List    Diagnosis Date Noted    Pubic ramus fracture, right, closed, initial encounter ([facility]) 04/21/2024    Psoriasis 05/03/2017    Dry eyes 03/20/2017    Insomnia 04/18/2016    Urinary frequency 02/02/2015    Glaucoma 12/10/2014    Diabetes mellitus type II, uncontrolled 08/05/2014    Heart murmur previously undiagnosed 04/24/2013    Carotid bruit 03/11/2011    HX: breast cancer 03/11/2011    Vitamin D deficiency 04/07/2010    Pure hypercholesterolemia 09/11/2009    Essential hypertension 09/11/2009    Mood changes 09/11/2009     Family History   Problem Relation  - 78 U/L    AST 26 15 - 37 U/L    Alk Phosphatase 220 (H) 45 - 117 U/L    Total Protein 7.6 6.4 - 8.2 g/dL    Albumin 3.6 3.5 - 5.0 g/dL    Globulin 4.0 2.0 - 4.0 g/dL    Albumin/Globulin Ratio 0.9 (L) 1.1 - 2.2     Troponin    Collection Time: 04/21/24  9:14 AM   Result Value Ref Range    Troponin, High Sensitivity 6 0 - 51 ng/L   Lipase    Collection Time: 04/21/24  9:14 AM   Result Value Ref Range    Lipase 51 13 - 75 U/L   CK    Collection Time: 04/21/24  9:14 AM   Result Value Ref Range    Total CK 82 26 - 192 U/L   Hemoglobin A1C    Collection Time: 04/21/24  9:14 AM   Result Value Ref Range    Hemoglobin A1C 5.9 (H) 4.0 - 5.6 %    Estimated Avg Glucose 123 mg/dL   EKG 12 Lead    Collection Time: 04/21/24  9:15 AM   Result Value Ref Range    Ventricular Rate 71 BPM    Atrial Rate 71 BPM    P-R Interval 182 ms    QRS Duration 70 ms    Q-T Interval 372 ms    QTc Calculation (Bazett) 404 ms    P Axis 49 degrees    R Axis 10 degrees    T Axis 39 degrees    Diagnosis       Normal sinus rhythm  Possible Inferior infarct (cited on or before 03-JUN-2010)  Poor R-wave Progression (consider lead placement or loss of anterior forces)  When compared with ECG of 14-JAN-2021 17:35,  No significant change was found  Confirmed by Tim Cervantes ([id number]) on 4/21/2024 1:19:03 PM     Urine Culture Hold Sample    Collection Time: 04/21/24  3:50 PM    Specimen: Urine   Result Value Ref Range    Specimen HOld        Urine on hold in Microbiology dept for 2 days.  If unpreserved urine is submitted, it cannot be used for addtional testing after 24 hours, recollection will be required.   Urinalysis with Microscopic    Collection Time: 04/21/24  3:50 PM   Result Value Ref Range    Color, UA YELLOW/STRAW      Appearance CLEAR CLEAR      Specific Gravity, UA 1.015 1.003 - 1.030      pH, Urine 5.0 5.0 - 8.0      Protein, UA TRACE (A) NEG mg/dL    Glucose, UA Negative NEG mg/dL    Ketones, Urine Negative NEG mg/dL    Bilirubin Urine

## 2024-04-22 NOTE — CARE COORDINATION
Care Management Initial Assessment       RUR: calculating  Readmission? No  1st IM letter given? Yes - 4/22  1st  letter given: No    CM reviewed chart, met with patient at bedside to introduce self and explain role. Patient states she is independent with ADLs prior to admission. Patient has had multiple falls recently at home. Patient lives alone with her cat in a 1 level apartment with 5 steps to enter. Patient has a non surgical injury, the care team is recommending SNF. CM verified with attending/[employer] plan for SNF. CM provided patient with SNF list, patient requests to review and will provide choices this afternoon or tomorrow morning. CM will follow up with patient for choices.        04/22/24 7276   Service Assessment   Patient Orientation Alert and Oriented;Person;Place;Situation;Self   Cognition Alert   History Provided By Patient   Primary Caregiver Self   Support Systems Family Members   PCP Verified by CM Yes   Last Visit to PCP Within last 3 months   Prior Functional Level Independent in ADLs/IADLs   Can patient return to prior living arrangement No   Ability to make needs known: Good   Family able to assist with home care needs: Yes   Financial Resources Medicare   Social/Functional History   Lives With Alone   Type of Home Apartment   Home Layout One level   Home Access Stairs to enter with rails   Entrance Stairs - Number of Steps 5   Home Equipment Cane;Walker, rolling   Discharge Planning   Type of Residence Skilled Nursing Facility   DME Ordered? No   Services At/After Discharge   Services At/After Discharge Skilled Nursing Facility (SNF)   Condition of Participation: Discharge Planning   The Plan for Transition of Care is related to the following treatment goals: SNF   The Patient and/or Patient Representative was provided with a Choice of Provider? Patient   The Patient and/Or Patient Representative agree with the Discharge Plan? Yes   Freedom of Choice list was provided with basic

## 2024-04-22 NOTE — PLAN OF CARE
Problem: Physical Therapy - Adult  Goal: By Discharge: Performs mobility at highest level of function for planned discharge setting.  See evaluation for individualized goals.  Description: FUNCTIONAL STATUS PRIOR TO ADMISSION: Patient was modified independent using a rolling walker or SPC for functional mobility.  Pt reports she falls \"just about every day\".     HOME SUPPORT PRIOR TO ADMISSION: The patient lived alone with her cat Yolette. Pt reports she has friends that can assist her if needed.    Physical Therapy Goals  Initiated 4/22/2024  1.  Patient will move from supine to sit and sit to supine in bed with modified independence within 7 day(s).    2.  Patient will perform sit to stand with modified independence within 7 day(s).  3.  Patient will transfer from bed to chair and chair to bed with supervision/set-up using the least restrictive device within 7 day(s).  4.  Patient will ambulate with supervision/set-up for 150 feet with the least restrictive device within 7 day(s).   5.  Patient will ascend/descend 4 stairs with 1 handrail(s) with minimal assistance within 7 day(s).   Outcome: Progressing   PHYSICAL THERAPY EVALUATION    Patient: Erin Galindo (73 y.o. female)  Date: 4/22/2024  Primary Diagnosis: Inferior pubic ramus fracture, right, closed, initial encounter (HCC) [S32.591A]  Pubic ramus fracture, right, closed, initial encounter ([facility]) [S32.591A]       Precautions: Restrictions/Precautions: Fall Risk, Weight Bearing   Lower Extremity Weight Bearing Restrictions  Right Lower Extremity Weight Bearing: Weight Bearing As Tolerated                  ASSESSMENT :   DEFICITS/IMPAIRMENTS:   The patient is limited by decreased functional mobility, independence in ADLs, high-level IADLs, ROM, strength, activity tolerance, cognition, attention/concentration, balance, dizziness, and increased pain levels S/P admission with a fall resulting in Inferior pubic ramus fracture, right, closed.  Per chart review                                                                                                                                                                                                               [facility] AM-PAC®      Basic Mobility Inpatient Short Form (6-Clicks) Version 2  How much HELP from another person do you currently need... (If the patient hasn't done an activity recently, how much help from another person do you think they would need if they tried?) Total A Lot A Little None   1.  Turning from your back to your side while in a flat bed without using bedrails? []  1 []  2 [x]  3  []  4   2.  Moving from lying on your back to sitting on the side of a flat bed without using bedrails? []  1 []  2 [x]  3  []  4   3.  Moving to and from a bed to a chair (including a wheelchair)? []  1 []  2 [x]  3  []  4   4. Standing up from a chair using your arms (e.g. wheelchair or bedside chair)? []  1 []  2 [x]  3  []  4   5.  Walking in hospital room? []  1 []  2 [x]  3  []  4   6.  Climbing 3-5 steps with a railing? []  1 [x]  2 []  3  []  4     Raw Score: 17/24                            Cutoff score ?171,2,3 had higher odds of discharging home with home health or need of SNF/IPR.    1. Salud Jorge, Elise Wood, Galo Guzman, Loida Castañeda, Teodoro aVil, Estevan Jorge.  Validity of the -PAC “6-Clicks” Inpatient Daily Activity and Basic Mobility Short Forms. Physical Therapy Mar 2014, [phone]) [phone]; DOI: 10.2522/ptj.26284377  2. Jeffery LEWIS, Martha SEGAL, Rosanna SEGAL, Alyssa SEGAL. Association of AM-PAC \"6-Clicks\" Basic Mobility and Daily Activity Scores With Discharge Destination. Phys Ther. 2021 Apr 4;101(4):puwm592. doi: 10.1093/ptj/ftyo650. PMID: 78479242.  3. Flavio SEGAL, Saurabh PATE, Amisha S, Gavin K, Chun S. Activity Measure for Post-Acute Care \"6-Clicks\" Basic Mobility Scores Predict Discharge Destination After Acute Care Hospitalization in Select Patient Groups: A

## 2024-04-22 NOTE — PLAN OF CARE
Equipment: Cane, Walker, rolling  Has the patient had two or more falls in the past year or any fall with injury in the past year?: Yes (Pt reports she falls \"just about every day\".)  Receives Help From: Friend(s)  Active : Yes  Mode of Transportation: Car      Hand Dominance: unknown     EXAMINATION OF PERFORMANCE DEFICITS:    Cognitive/Behavioral Status:  Orientation  Overall Orientation Status: Within Functional Limits  Orientation Level: Oriented to person;Oriented to place;Oriented to situation;Oriented to time (Pt able to report correct month and day (April 22), but incorrect year 2023)  Cognition  Overall Cognitive Status: Exceptions  Arousal/Alertness: Appropriate responses to stimuli  Following Commands: Follows one step commands with repetition;Follows one step commands with increased time  Attention Span: Difficulty dividing attention;Attends with cues to redirect  Memory: Decreased recall of precautions  Safety Judgement: Decreased awareness of need for assistance;Decreased awareness of need for safety    Skin: bruising R groin    Edema: pelvic edema, R side>L side    Hearing:   Hearing  Hearing: Within functional limits    Vision/Perceptual:          Vision  Vision: Impaired  Vision Exceptions: Wears glasses for reading       Range of Motion:   AROM: Generally decreased, functional (BUEs)  PROM: Generally decreased, functional      Strength:  Strength: Generally decreased, functional      Coordination:  Coordination: Generally decreased, functional            Tone & Sensation:                 Functional Mobility and Transfers for ADLs:    Bed Mobility:     Bed Mobility Training  Bed Mobility Training: Yes  Interventions: Verbal cues;Tactile cues;Safety awareness training;Weight shifting training/pressure relief  Supine to Sit: Minimum assistance;Adaptive equipment;Additional time (Min A RLE mgmt, Min A trunk elevation. HOB elevated, use of bed rail)  Sit to Supine:  (NT; pt left sitting up in  necessary to enable pt to complete eval   Based on the above components, the patient evaluation is determined to be of the following complexity level: Low

## 2024-04-22 NOTE — PROGRESS NOTES
Lovenox Monitoring  Indication: DVT Prophylaxis  Recent Labs     04/21/24  0914 04/22/24  0837   HGB 10.8* 9.5*    154     Current Weight: 70.7 kg  Est. CrCl = 30 ml/min  Current Dose: 30 mg subcutaneously every 24 hours.  Plan: Change to 40 mg sc every 24 hour for crcl =30.

## 2024-04-23 LAB
ALBUMIN SERPL-MCNC: 2.8 G/DL (ref 3.5–5)
ALBUMIN/GLOB SERPL: 0.9 (ref 1.1–2.2)
ALP SERPL-CCNC: 164 U/L (ref 45–117)
ALT SERPL-CCNC: 23 U/L (ref 12–78)
ANION GAP SERPL CALC-SCNC: 1 MMOL/L (ref 5–15)
AST SERPL-CCNC: 20 U/L (ref 15–37)
BASOPHILS # BLD: 0 K/UL (ref 0–0.1)
BASOPHILS NFR BLD: 0 % (ref 0–1)
BILIRUB SERPL-MCNC: 0.3 MG/DL (ref 0.2–1)
BUN SERPL-MCNC: 33 MG/DL (ref 6–20)
BUN/CREAT SERPL: 23 (ref 12–20)
CALCIUM SERPL-MCNC: 8.1 MG/DL (ref 8.5–10.1)
CHLORIDE SERPL-SCNC: 118 MMOL/L (ref 97–108)
CO2 SERPL-SCNC: 23 MMOL/L (ref 21–32)
CREAT SERPL-MCNC: 1.45 MG/DL (ref 0.55–1.02)
DIFFERENTIAL METHOD BLD: ABNORMAL
EOSINOPHIL # BLD: 0.1 K/UL (ref 0–0.4)
EOSINOPHIL NFR BLD: 2 % (ref 0–7)
ERYTHROCYTE [DISTWIDTH] IN BLOOD BY AUTOMATED COUNT: 14.2 % (ref 11.5–14.5)
GLOBULIN SER CALC-MCNC: 3.2 G/DL (ref 2–4)
GLUCOSE BLD STRIP.AUTO-MCNC: 113 MG/DL (ref 65–117)
GLUCOSE BLD STRIP.AUTO-MCNC: 163 MG/DL (ref 65–117)
GLUCOSE BLD STRIP.AUTO-MCNC: 92 MG/DL (ref 65–117)
GLUCOSE BLD STRIP.AUTO-MCNC: 95 MG/DL (ref 65–117)
GLUCOSE SERPL-MCNC: 108 MG/DL (ref 65–100)
HCT VFR BLD AUTO: 30.2 % (ref 35–47)
HGB BLD-MCNC: 9.1 G/DL (ref 11.5–16)
IMM GRANULOCYTES # BLD AUTO: 0 K/UL (ref 0–0.04)
IMM GRANULOCYTES NFR BLD AUTO: 0 % (ref 0–0.5)
LYMPHOCYTES # BLD: 1.6 K/UL (ref 0.8–3.5)
LYMPHOCYTES NFR BLD: 24 % (ref 12–49)
MCH RBC QN AUTO: 28 PG (ref 26–34)
MCHC RBC AUTO-ENTMCNC: 30.1 G/DL (ref 30–36.5)
MCV RBC AUTO: 92.9 FL (ref 80–99)
MONOCYTES # BLD: 0.6 K/UL (ref 0–1)
MONOCYTES NFR BLD: 9 % (ref 5–13)
NEUTS SEG # BLD: 4.4 K/UL (ref 1.8–8)
NEUTS SEG NFR BLD: 65 % (ref 32–75)
NRBC # BLD: 0 K/UL (ref 0–0.01)
NRBC BLD-RTO: 0 PER 100 WBC
PLATELET # BLD AUTO: 142 K/UL (ref 150–400)
PMV BLD AUTO: 10 FL (ref 8.9–12.9)
POTASSIUM SERPL-SCNC: 4.2 MMOL/L (ref 3.5–5.1)
PROT SERPL-MCNC: 6 G/DL (ref 6.4–8.2)
RBC # BLD AUTO: 3.25 M/UL (ref 3.8–5.2)
SERVICE CMNT-IMP: ABNORMAL
SERVICE CMNT-IMP: NORMAL
SODIUM SERPL-SCNC: 142 MMOL/L (ref 136–145)
WBC # BLD AUTO: 6.7 K/UL (ref 3.6–11)

## 2024-04-23 PROCEDURE — 97530 THERAPEUTIC ACTIVITIES: CPT

## 2024-04-23 PROCEDURE — 6370000000 HC RX 637 (ALT 250 FOR IP)

## 2024-04-23 PROCEDURE — 97116 GAIT TRAINING THERAPY: CPT

## 2024-04-23 PROCEDURE — 80053 COMPREHEN METABOLIC PANEL: CPT

## 2024-04-23 PROCEDURE — 6370000000 HC RX 637 (ALT 250 FOR IP): Performed by: INTERNAL MEDICINE

## 2024-04-23 PROCEDURE — 1100000000 HC RM PRIVATE

## 2024-04-23 PROCEDURE — 82962 GLUCOSE BLOOD TEST: CPT

## 2024-04-23 PROCEDURE — 2580000003 HC RX 258: Performed by: INTERNAL MEDICINE

## 2024-04-23 PROCEDURE — 36415 COLL VENOUS BLD VENIPUNCTURE: CPT

## 2024-04-23 PROCEDURE — 85025 COMPLETE CBC W/AUTO DIFF WBC: CPT

## 2024-04-23 PROCEDURE — 6360000002 HC RX W HCPCS: Performed by: INTERNAL MEDICINE

## 2024-04-23 PROCEDURE — 6360000002 HC RX W HCPCS: Performed by: FAMILY MEDICINE

## 2024-04-23 PROCEDURE — 97535 SELF CARE MNGMENT TRAINING: CPT

## 2024-04-23 RX ADMIN — ESCITALOPRAM OXALATE 10 MG: 10 TABLET ORAL at 08:44

## 2024-04-23 RX ADMIN — INSULIN LISPRO 3 UNITS: 100 INJECTION, SOLUTION INTRAVENOUS; SUBCUTANEOUS at 16:42

## 2024-04-23 RX ADMIN — TRAMADOL HYDROCHLORIDE 50 MG: 50 TABLET, COATED ORAL at 13:00

## 2024-04-23 RX ADMIN — METOPROLOL TARTRATE 25 MG: 25 TABLET, FILM COATED ORAL at 08:44

## 2024-04-23 RX ADMIN — INSULIN LISPRO 3 UNITS: 100 INJECTION, SOLUTION INTRAVENOUS; SUBCUTANEOUS at 11:56

## 2024-04-23 RX ADMIN — TRAMADOL HYDROCHLORIDE 50 MG: 50 TABLET, COATED ORAL at 03:51

## 2024-04-23 RX ADMIN — SENNOSIDES AND DOCUSATE SODIUM 1 TABLET: 8.6; 5 TABLET ORAL at 20:36

## 2024-04-23 RX ADMIN — ENOXAPARIN SODIUM 40 MG: 100 INJECTION SUBCUTANEOUS at 08:45

## 2024-04-23 RX ADMIN — SODIUM CHLORIDE, PRESERVATIVE FREE 10 ML: 5 INJECTION INTRAVENOUS at 08:45

## 2024-04-23 RX ADMIN — BUPROPION HYDROCHLORIDE 300 MG: 300 TABLET, EXTENDED RELEASE ORAL at 08:44

## 2024-04-23 RX ADMIN — MORPHINE SULFATE 2 MG: 2 INJECTION, SOLUTION INTRAMUSCULAR; INTRAVENOUS at 08:55

## 2024-04-23 RX ADMIN — METOPROLOL TARTRATE 25 MG: 25 TABLET, FILM COATED ORAL at 20:36

## 2024-04-23 RX ADMIN — TRAMADOL HYDROCHLORIDE 50 MG: 50 TABLET, COATED ORAL at 20:43

## 2024-04-23 RX ADMIN — INSULIN LISPRO 3 UNITS: 100 INJECTION, SOLUTION INTRAVENOUS; SUBCUTANEOUS at 08:45

## 2024-04-23 ASSESSMENT — PAIN DESCRIPTION - ORIENTATION: ORIENTATION: RIGHT

## 2024-04-23 ASSESSMENT — PAIN DESCRIPTION - LOCATION
LOCATION: PELVIS
LOCATION: HIP;PELVIS
LOCATION: PELVIS

## 2024-04-23 ASSESSMENT — PAIN DESCRIPTION - DESCRIPTORS
DESCRIPTORS: GNAWING;JABBING
DESCRIPTORS: STABBING;JABBING

## 2024-04-23 ASSESSMENT — PAIN SCALES - GENERAL
PAINLEVEL_OUTOF10: 9
PAINLEVEL_OUTOF10: 9
PAINLEVEL_OUTOF10: 3
PAINLEVEL_OUTOF10: 10
PAINLEVEL_OUTOF10: 8

## 2024-04-23 NOTE — PROGRESS NOTES
End of Shift Note    Bedside shift change report given to Eloise ELIZONDO (oncoming nurse) by Mana Cr LPN (offgoing nurse).  Report included the following information SBAR, Kardex, Intake/Output, and MAR    Shift worked:  0730-2000     Shift summary and any significant changes:    Room air   Up with 1 assist (patient very unsteady on her feet) Bed alarm on   Patient is easily redirected but still will attempt to get out of bed  Patient confused has pulled out 2 iv's today.   Tolerated diet   Tolerated medication    Concerns for physician to address: None    Zone phone for oncoming shift:  None        Activity:     Number times ambulated in hallways past shift: 0  Number of times OOB to chair past shift: 1    Cardiac:   Cardiac Monitoring: No           Access:  Current line(s): PIV     Genitourinary:   Urinary status: voiding and external catheter    Respiratory:      Chronic home O2 use?: NO  Incentive spirometer at bedside: YES       GI:     Current diet:  ADULT DIET; Regular; 5 carb choices (75 gm/meal)  DIET ONE TIME MESSAGE;  Passing flatus: YES  Tolerating current diet: YES       Pain Management:   Patient states pain is manageable on current regimen: YES    Skin:     Interventions: turn team, specialty bed, float heels, increase time out of bed, and foam dressing    Patient Safety:  Fall Score:    Interventions: bed/chair alarm, assistive device (walker, cane. etc), gripper socks, pt to call before getting OOB, and stay with me (per policy)       Length of Stay:  Expected LOS: 3  Actual LOS: 2      Mana Cr LPN

## 2024-04-23 NOTE — CARE COORDINATION
Transition of Care Plan:    RUR: calculating  Prior Level of Functioning: independent  Disposition: SNF [facility]  If SNF or IPR: Date FOC offered: 4/22  Date FOC received: 4/23  Accepting facility: [facility]  Date authorization started with reference number: SNF has started auth 4/23 Reference #[id number]   Follow up appointments: Per physician recommendation   DME needed: Defer to SNF  Transportation at discharge: stretcher   IM/IMM Medicare/ letter given:   Caregiver Contact: daughter Ariadna Jauregui [phone]  Barriers to discharge: medical stability, auth    CM reviewed chart. CM met with patient to discuss SNF choices. Patient would like to stay somewhere close to her home address, CM assisted patient in narrowing down the SNF choices by location and was able to provide CM with choices.     CM sent SNF referrals to: [facility].    CM will continue to follow.    11:15AM: [facility] is able to accept this patient and patient would like auth to be started. [facility].     Alice Bose RN/CRM

## 2024-04-23 NOTE — PROGRESS NOTES
Patient pulled out two IV's today and does not want another one placed. Dr. Beaver notified and is aware.

## 2024-04-23 NOTE — PLAN OF CARE
Problem: Pain  Goal: Verbalizes/displays adequate comfort level or baseline comfort level  Outcome: Not Progressing     Problem: Safety - Adult  Goal: Free from fall injury  4/23/2024 1425 by Mana Cr LPN  Outcome: Progressing     Problem: Skin/Tissue Integrity  Goal: Absence of new skin breakdown  Description: 1.  Monitor for areas of redness and/or skin breakdown  2.  Assess vascular access sites hourly  3.  Every 4-6 hours minimum:  Change oxygen saturation probe site  4.  Every 4-6 hours:  If on nasal continuous positive airway pressure, respiratory therapy assess nares and determine need for appliance change or resting period.  Outcome: Progressing     Problem: Occupational Therapy - Adult  Goal: By Discharge: Performs self-care activities at highest level of function for planned discharge setting.  See evaluation for individualized goals.  Description: FUNCTIONAL STATUS PRIOR TO ADMISSION:  Pt was Mod (I) with ADLs, IADLs, and functional mobility using RW and SPC.     Receives Help From: Friend(s),  ,  ,  ,  ,  ,  ,  ,  ,  , Active : Yes      HOME SUPPORT: Pt lives alone with her cat \"Niyah\". Pt did not require assistance prior to admission, but states her friends are able to assist as needed.    Occupational Therapy Goals  Initiated 4/22/2024    1. Patient will perform lower body dressing with Moderate Assist using AE prn within 7 day(s).  2. Patient will perform upper body ADLS standing for 5 minutes without fatigue or LOB with CGA within 7 days.  3. Patient will perform all aspects of toileting at Moderate Assist within 7 days.  4. Patient will perform toilet transfers with Contact Guard Assist using rolling walker within 7 days.  5. Patient will utilize energy conservation techniques during functional activities without cues within 7 day(s).     4/23/2024 1448 by Christiana Benson, SIERRA  Outcome: Progressing     Problem: Physical Therapy - Adult  Goal: By Discharge: Performs mobility at  highest level of function for planned discharge setting.  See evaluation for individualized goals.  Description: FUNCTIONAL STATUS PRIOR TO ADMISSION: Patient was modified independent using a rolling walker or SPC for functional mobility.  Pt reports she falls \"just about every day\".     HOME SUPPORT PRIOR TO ADMISSION: The patient lived alone with her cat Yolette. Pt reports she has friends that can assist her if needed.    Physical Therapy Goals  Initiated 4/22/2024  1.  Patient will move from supine to sit and sit to supine in bed with modified independence within 7 day(s).    2.  Patient will perform sit to stand with modified independence within 7 day(s).  3.  Patient will transfer from bed to chair and chair to bed with supervision/set-up using the least restrictive device within 7 day(s).  4.  Patient will ambulate with supervision/set-up for 150 feet with the least restrictive device within 7 day(s).   5.  Patient will ascend/descend 4 stairs with 1 handrail(s) with minimal assistance within 7 day(s).   4/23/2024 1438 by Margarita Garrison, PT  Outcome: Progressing     Problem: Pain  Goal: Verbalizes/displays adequate comfort level or baseline comfort level  Outcome: Not Progressing

## 2024-04-23 NOTE — PROGRESS NOTES
[facility] Adult  Hospitalist Group                                                                                          Hospitalist Progress Note  Camille Kim PA-C  Answering service: [phone] OR [phone] from in house phone        Date of Service:  2024  NAME:  Erin Galindo  :  1950  MRN:  [id number]       Admission Summary:     Erin Galindo is a 73 y.o. female with h/o R breast CA s/p lumpectomy/chemo/XRT (Onc Dr Sun), CKD Stage 3 (Renal Dr John Land), depression, T2DM (Endo Dr Segura) with retinopathy, DJD, SALEH, hearing deficit, HTN, HLD, insomnia, chronic back pain (Ortho Dr Alcocer), pancreatic pseudocyst, obesity s/p gastric bypass, R vision loss s/p childhood trauma, and vitamin D deficiency who was BIBEMS from home s/p GLF with head trauma, dizziness, and R  hip pain. Pt was getting up to use the bathroom using her walker this morning, was very dizzy, then fell. She hit the top and right side of her head on the TV, denied LOC, but was unable to get up. She stayed on the floor for about 1 hour until she was able to call 911 from the phone on the floor. She also hit her R shoulder. She c/o diarrhea, numbness/tingling all over. She denies f/c/n/v, CP, abdo pain, SOB, cough, recent COVID/flu, dysuria. Pt lives alone. In the ED, CTAP showed acute R inferior pubic ramus fracture. Ortho Dr Aguilera was called by the ED MD and recommended nonoperative management, WBAT, PT/OT evals.        Interval history / Subjective:     F/u non operable R pubic ramus fx   Patient lives alone, walks with RW at baseline, mobility further impaired with recent pubic fracture, PT recommending IPR. Discussed with CM, given insurance restrictions, plan to dc to SNF. SNF auth pending.        Assessment & Plan:     Acute inferior R pubic ramus fracture  DJD  Chronic back pain, follows with Ly lAcocer  Vitamin D deficiency  S/p recurrent falls  - admit inpatient remote telemetry on

## 2024-04-23 NOTE — PLAN OF CARE
Problem: Occupational Therapy - Adult  Goal: By Discharge: Performs self-care activities at highest level of function for planned discharge setting.  See evaluation for individualized goals.  Description: FUNCTIONAL STATUS PRIOR TO ADMISSION:  Pt was Mod (I) with ADLs, IADLs, and functional mobility using RW and SPC.     Receives Help From: Friend(s),  ,  ,  ,  ,  ,  ,  ,  ,  , Active : Yes      HOME SUPPORT: Pt lives alone with her cat \"Niyah\". Pt did not require assistance prior to admission, but states her friends are able to assist as needed.    Occupational Therapy Goals  Initiated 4/22/2024    1. Patient will perform lower body dressing with Moderate Assist using AE prn within 7 day(s).  2. Patient will perform upper body ADLS standing for 5 minutes without fatigue or LOB with CGA within 7 days.  3. Patient will perform all aspects of toileting at Moderate Assist within 7 days.  4. Patient will perform toilet transfers with Contact Guard Assist using rolling walker within 7 days.  5. Patient will utilize energy conservation techniques during functional activities without cues within 7 day(s).     Outcome: Progressing  OCCUPATIONAL THERAPY TREATMENT  Patient: Erin Galindo (73 y.o. female)  Date: 4/23/2024  Primary Diagnosis: Inferior pubic ramus fracture, right, closed, initial encounter (HCC) [S32.591A]  Pubic ramus fracture, right, closed, initial encounter ([facility]) [S32.591A]       Precautions: Fall Risk, Weight Bearing Right Lower Extremity Weight Bearing: Weight Bearing As Tolerated              Chart, occupational therapy assessment, plan of care, and goals were reviewed.    ASSESSMENT  Patient continues to benefit from skilled OT services and is slowly progressing towards goals, functional performance limited by pain, impaired functional mobility and balance, and decreased activity tolerance . Pt received semisupine in bed, agreeable to participate. She transferred supine>sit with min A x2.  She initially reported feeling dizzy in sitting, this resolved quickly and pt had no further c/o dizziness. Using RW pt ambulated approx 10 feet in room and back to chair with min A x2, required frequent cueing for safe RW management and transfer technique. She also required 2 standing rest breaks due to RLE pain, reporting 10/10 pain with activity. Pt transferred to chair and performed grooming ADL with setup in supported sit. Began education on use of AE to assist with LB dressing, pt requested defer of practice at that time due to pain. She remained in chair with needs met and VSS at end of session. Patient continues to benefit from skilled intervention to address the above impairments. Continue to recommend SNF rehab at discharge.         PLAN :  Patient continues to benefit from skilled intervention to address the above impairments.  Continue treatment per established plan of care to address goals.    Recommendation for discharge: (in order for the patient to meet his/her long term goals): Therapy up to 5 days/week in Skilled nursing facility    Other factors to consider for discharge: lives alone, high risk for falls, and concern for safely navigating or managing the home environment    IF patient discharges home will need the following DME: continuing to assess with progress       SUBJECTIVE:   Patient stated “I need to stand here for a minute.”    OBJECTIVE DATA SUMMARY:   Cognitive/Behavioral Status:  Orientation  Overall Orientation Status: Within Normal Limits  Orientation Level: Oriented X4  Cognition  Overall Cognitive Status: Exceptions  Arousal/Alertness: Appropriate responses to stimuli  Following Commands: Follows one step commands with increased time;Follows one step commands consistently  Attention Span: Attends with cues to redirect  Safety Judgement: Decreased awareness of need for safety;Decreased awareness of need for assistance  Insights: Decreased awareness of deficits  Initiation: Requires

## 2024-04-23 NOTE — PLAN OF CARE
Problem: Safety - Adult  Goal: Free from fall injury  Outcome: Progressing     Problem: Pain  Goal: Verbalizes/displays adequate comfort level or baseline comfort level  Outcome: Progressing     Problem: Occupational Therapy - Adult  Goal: By Discharge: Performs self-care activities at highest level of function for planned discharge setting.  See evaluation for individualized goals.  Description: FUNCTIONAL STATUS PRIOR TO ADMISSION:  Pt was Mod (I) with ADLs, IADLs, and functional mobility using RW and SPC.     Receives Help From: Friend(s),  ,  ,  ,  ,  ,  ,  ,  ,  , Active : Yes      HOME SUPPORT: Pt lives alone with her cat \"Niyah\". Pt did not require assistance prior to admission, but states her friends are able to assist as needed.    Occupational Therapy Goals  Initiated 4/22/2024    1. Patient will perform lower body dressing with Moderate Assist using AE prn within 7 day(s).  2. Patient will perform upper body ADLS standing for 5 minutes without fatigue or LOB with CGA within 7 days.  3. Patient will perform all aspects of toileting at Moderate Assist within 7 days.  4. Patient will perform toilet transfers with Contact Guard Assist using rolling walker within 7 days.  5. Patient will utilize energy conservation techniques during functional activities without cues within 7 day(s).     4/22/2024 1451 by Bonnie Pardo OT  Outcome: Progressing     Problem: Physical Therapy - Adult  Goal: By Discharge: Performs mobility at highest level of function for planned discharge setting.  See evaluation for individualized goals.  Description: FUNCTIONAL STATUS PRIOR TO ADMISSION: Patient was modified independent using a rolling walker or SPC for functional mobility.  Pt reports she falls \"just about every day\".     HOME SUPPORT PRIOR TO ADMISSION: The patient lived alone with her cat Yolette. Pt reports she has friends that can assist her if needed.    Physical Therapy Goals  Initiated 4/22/2024  1.  Patient

## 2024-04-23 NOTE — PLAN OF CARE
Problem: Physical Therapy - Adult  Goal: By Discharge: Performs mobility at highest level of function for planned discharge setting.  See evaluation for individualized goals.  Description: FUNCTIONAL STATUS PRIOR TO ADMISSION: Patient was modified independent using a rolling walker or SPC for functional mobility.  Pt reports she falls \"just about every day\".     HOME SUPPORT PRIOR TO ADMISSION: The patient lived alone with her cat Yolette. Pt reports she has friends that can assist her if needed.    Physical Therapy Goals  Initiated 4/22/2024  1.  Patient will move from supine to sit and sit to supine in bed with modified independence within 7 day(s).    2.  Patient will perform sit to stand with modified independence within 7 day(s).  3.  Patient will transfer from bed to chair and chair to bed with supervision/set-up using the least restrictive device within 7 day(s).  4.  Patient will ambulate with supervision/set-up for 150 feet with the least restrictive device within 7 day(s).   5.  Patient will ascend/descend 4 stairs with 1 handrail(s) with minimal assistance within 7 day(s).   Outcome: Progressing   PHYSICAL THERAPY TREATMENT    Patient: Erin Galindo (73 y.o. female)  Date: 4/23/2024  Diagnosis: Inferior pubic ramus fracture, right, closed, initial encounter (HCC) [S32.591A]  Pubic ramus fracture, right, closed, initial encounter (HCC) [S32.591A] Pubic ramus fracture, right, closed, initial encounter (HCC)      Precautions: Fall Risk, Weight Bearing Right Lower Extremity Weight Bearing: Weight Bearing As Tolerated                    ASSESSMENT:  Patient continues to benefit from skilled PT services and is slowly progressing towards goals. Pt received in bed and agreeable to mobilize with therapy.  Pt required maximum verbal cueing for bed mobility and minimal assist x 2 to achieve sitting EOB.  Pt c/o dizziness upon sitting but resolved with time. She stood and ambulated an increased distance of 10  feet with slow, delayed, antalgic gait, 10/10.  Pt agreeable to remain in chair at session end.  Recommend SNF to maximize her functional mobility to highest level prior to returning home.  Pt lives alone.           PLAN:  Patient continues to benefit from skilled intervention to address the above impairments.  Continue treatment per established plan of care.        Recommendation for discharge: (in order for the patient to meet his/her long term goals): Therapy up to 5 days/week in Skilled nursing facility, may need to consider [facility] as long term plan to frequent falls    Other factors to consider for discharge: lives alone, poor safety awareness, impaired cognition, high risk for falls, not safe to be alone, and concern for safely navigating or managing the home environment    IF patient discharges home will need the following DME: continuing to assess with progress, recommend life alert or similar device due to frequent falls        SUBJECTIVE:   Patient stated, \"I am dizzy(upon sitting).\"    OBJECTIVE DATA SUMMARY:   Critical Behavior:  Orientation  Overall Orientation Status: Within Normal Limits  Orientation Level: Oriented X4  Cognition  Overall Cognitive Status: Exceptions  Arousal/Alertness: Appropriate responses to stimuli  Following Commands: Follows one step commands with increased time;Follows one step commands consistently  Attention Span: Attends with cues to redirect  Safety Judgement: Decreased awareness of need for safety;Decreased awareness of need for assistance  Insights: Decreased awareness of deficits  Initiation: Requires cues for some  Sequencing: Requires cues for some    Functional Mobility Training:  Bed Mobility:  Bed Mobility Training  Interventions: Safety awareness training;Verbal cues;Tactile cues  Supine to Sit: Minimum assistance;Assist X2;Additional time;Adaptive equipment  Scooting: Minimum assistance;Assist X1;Additional time  Transfers:  Transfer Training  Interventions: Safety

## 2024-04-23 NOTE — PLAN OF CARE
Problem: Safety - Adult  Goal: Free from fall injury  4/23/2024 1425 by Mana Cr LPN  Outcome: Progressing  4/23/2024 0044 by Tiera Castellanos RN  Outcome: Progressing     Problem: Pain  Goal: Verbalizes/displays adequate comfort level or baseline comfort level  4/23/2024 0044 by Tiera Castellanos RN  Outcome: Progressing

## 2024-04-23 NOTE — NURSE NAVIGATOR
Bedside and Verbal shift change report given to MARISOL Villareal RN (oncoming nurse) by ART Castellanos RN (offgoing nurse). Report included the following information Nurse Handoff Report, Intake/Output, MAR, and Recent Results.

## 2024-04-24 LAB
ALBUMIN SERPL-MCNC: 2.8 G/DL (ref 3.5–5)
ALBUMIN/GLOB SERPL: 0.8 (ref 1.1–2.2)
ALP SERPL-CCNC: 165 U/L (ref 45–117)
ALT SERPL-CCNC: 22 U/L (ref 12–78)
ANION GAP SERPL CALC-SCNC: 5 MMOL/L (ref 5–15)
AST SERPL-CCNC: 26 U/L (ref 15–37)
BASOPHILS # BLD: 0 K/UL (ref 0–0.1)
BASOPHILS NFR BLD: 0 % (ref 0–1)
BILIRUB SERPL-MCNC: 0.3 MG/DL (ref 0.2–1)
BUN SERPL-MCNC: 35 MG/DL (ref 6–20)
BUN/CREAT SERPL: 24 (ref 12–20)
CALCIUM SERPL-MCNC: 8.9 MG/DL (ref 8.5–10.1)
CHLORIDE SERPL-SCNC: 115 MMOL/L (ref 97–108)
CO2 SERPL-SCNC: 22 MMOL/L (ref 21–32)
CREAT SERPL-MCNC: 1.45 MG/DL (ref 0.55–1.02)
DIFFERENTIAL METHOD BLD: ABNORMAL
EOSINOPHIL # BLD: 0.1 K/UL (ref 0–0.4)
EOSINOPHIL NFR BLD: 1 % (ref 0–7)
ERYTHROCYTE [DISTWIDTH] IN BLOOD BY AUTOMATED COUNT: 14.2 % (ref 11.5–14.5)
GLOBULIN SER CALC-MCNC: 3.3 G/DL (ref 2–4)
GLUCOSE BLD STRIP.AUTO-MCNC: 108 MG/DL (ref 65–117)
GLUCOSE BLD STRIP.AUTO-MCNC: 116 MG/DL (ref 65–117)
GLUCOSE BLD STRIP.AUTO-MCNC: 86 MG/DL (ref 65–117)
GLUCOSE BLD STRIP.AUTO-MCNC: 89 MG/DL (ref 65–117)
GLUCOSE SERPL-MCNC: 119 MG/DL (ref 65–100)
HCT VFR BLD AUTO: 30.3 % (ref 35–47)
HGB BLD-MCNC: 9.2 G/DL (ref 11.5–16)
IMM GRANULOCYTES # BLD AUTO: 0 K/UL (ref 0–0.04)
IMM GRANULOCYTES NFR BLD AUTO: 0 % (ref 0–0.5)
LYMPHOCYTES # BLD: 1.4 K/UL (ref 0.8–3.5)
LYMPHOCYTES NFR BLD: 17 % (ref 12–49)
MCH RBC QN AUTO: 28.1 PG (ref 26–34)
MCHC RBC AUTO-ENTMCNC: 30.4 G/DL (ref 30–36.5)
MCV RBC AUTO: 92.7 FL (ref 80–99)
MONOCYTES # BLD: 0.6 K/UL (ref 0–1)
MONOCYTES NFR BLD: 8 % (ref 5–13)
NEUTS SEG # BLD: 6.3 K/UL (ref 1.8–8)
NEUTS SEG NFR BLD: 74 % (ref 32–75)
NRBC # BLD: 0 K/UL (ref 0–0.01)
NRBC BLD-RTO: 0 PER 100 WBC
PLATELET # BLD AUTO: 140 K/UL (ref 150–400)
PMV BLD AUTO: 10 FL (ref 8.9–12.9)
POTASSIUM SERPL-SCNC: 5.2 MMOL/L (ref 3.5–5.1)
PROT SERPL-MCNC: 6.1 G/DL (ref 6.4–8.2)
RBC # BLD AUTO: 3.27 M/UL (ref 3.8–5.2)
SERVICE CMNT-IMP: NORMAL
SODIUM SERPL-SCNC: 142 MMOL/L (ref 136–145)
WBC # BLD AUTO: 8.5 K/UL (ref 3.6–11)

## 2024-04-24 PROCEDURE — 6370000000 HC RX 637 (ALT 250 FOR IP): Performed by: INTERNAL MEDICINE

## 2024-04-24 PROCEDURE — 85025 COMPLETE CBC W/AUTO DIFF WBC: CPT

## 2024-04-24 PROCEDURE — 6370000000 HC RX 637 (ALT 250 FOR IP)

## 2024-04-24 PROCEDURE — 36415 COLL VENOUS BLD VENIPUNCTURE: CPT

## 2024-04-24 PROCEDURE — 6360000002 HC RX W HCPCS: Performed by: FAMILY MEDICINE

## 2024-04-24 PROCEDURE — 82962 GLUCOSE BLOOD TEST: CPT

## 2024-04-24 PROCEDURE — 97116 GAIT TRAINING THERAPY: CPT

## 2024-04-24 PROCEDURE — 80053 COMPREHEN METABOLIC PANEL: CPT

## 2024-04-24 PROCEDURE — 1100000000 HC RM PRIVATE

## 2024-04-24 RX ORDER — ENOXAPARIN SODIUM 100 MG/ML
30 INJECTION SUBCUTANEOUS DAILY
Status: DISCONTINUED | OUTPATIENT
Start: 2024-04-25 | End: 2024-04-25 | Stop reason: HOSPADM

## 2024-04-24 RX ADMIN — METOPROLOL TARTRATE 25 MG: 25 TABLET, FILM COATED ORAL at 21:35

## 2024-04-24 RX ADMIN — SENNOSIDES AND DOCUSATE SODIUM 1 TABLET: 8.6; 5 TABLET ORAL at 21:30

## 2024-04-24 RX ADMIN — TRAMADOL HYDROCHLORIDE 50 MG: 50 TABLET, COATED ORAL at 05:00

## 2024-04-24 RX ADMIN — ESCITALOPRAM OXALATE 10 MG: 10 TABLET ORAL at 08:48

## 2024-04-24 RX ADMIN — INSULIN LISPRO 3 UNITS: 100 INJECTION, SOLUTION INTRAVENOUS; SUBCUTANEOUS at 08:47

## 2024-04-24 RX ADMIN — METOPROLOL TARTRATE 25 MG: 25 TABLET, FILM COATED ORAL at 08:48

## 2024-04-24 RX ADMIN — SENNOSIDES AND DOCUSATE SODIUM 1 TABLET: 8.6; 5 TABLET ORAL at 08:48

## 2024-04-24 RX ADMIN — ENOXAPARIN SODIUM 40 MG: 100 INJECTION SUBCUTANEOUS at 08:48

## 2024-04-24 RX ADMIN — BUPROPION HYDROCHLORIDE 300 MG: 300 TABLET, EXTENDED RELEASE ORAL at 08:48

## 2024-04-24 RX ADMIN — TRAMADOL HYDROCHLORIDE 50 MG: 50 TABLET, COATED ORAL at 09:49

## 2024-04-24 RX ADMIN — TRAMADOL HYDROCHLORIDE 50 MG: 50 TABLET, COATED ORAL at 19:09

## 2024-04-24 ASSESSMENT — PAIN SCALES - GENERAL
PAINLEVEL_OUTOF10: 0
PAINLEVEL_OUTOF10: 8
PAINLEVEL_OUTOF10: 8
PAINLEVEL_OUTOF10: 7
PAINLEVEL_OUTOF10: 2

## 2024-04-24 ASSESSMENT — PAIN DESCRIPTION - LOCATION
LOCATION: HIP
LOCATION: PELVIS
LOCATION: LEG;HIP

## 2024-04-24 ASSESSMENT — PAIN DESCRIPTION - ORIENTATION
ORIENTATION: RIGHT

## 2024-04-24 ASSESSMENT — PAIN DESCRIPTION - DESCRIPTORS
DESCRIPTORS: ACHING;BURNING
DESCRIPTORS: ACHING;CRUSHING

## 2024-04-24 ASSESSMENT — PAIN SCALES - WONG BAKER: WONGBAKER_NUMERICALRESPONSE: NO HURT

## 2024-04-24 NOTE — PLAN OF CARE
Problem: Physical Therapy - Adult  Goal: By Discharge: Performs mobility at highest level of function for planned discharge setting.  See evaluation for individualized goals.  Description: FUNCTIONAL STATUS PRIOR TO ADMISSION: Patient was modified independent using a rolling walker or SPC for functional mobility.  Pt reports she falls \"just about every day\".     HOME SUPPORT PRIOR TO ADMISSION: The patient lived alone with her cat Yolette. Pt reports she has friends that can assist her if needed.    Physical Therapy Goals  Initiated 4/22/2024  1.  Patient will move from supine to sit and sit to supine in bed with modified independence within 7 day(s).    2.  Patient will perform sit to stand with modified independence within 7 day(s).  3.  Patient will transfer from bed to chair and chair to bed with supervision/set-up using the least restrictive device within 7 day(s).  4.  Patient will ambulate with supervision/set-up for 150 feet with the least restrictive device within 7 day(s).   5.  Patient will ascend/descend 4 stairs with 1 handrail(s) with minimal assistance within 7 day(s).   Outcome: Not Progressing   PHYSICAL THERAPY TREATMENT    Patient: Erin Galindo (73 y.o. female)  Date: 4/24/2024  Diagnosis: Inferior pubic ramus fracture, right, closed, initial encounter (HCC) [S32.591A]  Pubic ramus fracture, right, closed, initial encounter (HCC) [S32.591A] Pubic ramus fracture, right, closed, initial encounter (HCC)      Precautions: Fall Risk, Weight Bearing Right Lower Extremity Weight Bearing: Weight Bearing As Tolerated                    ASSESSMENT:  Patient continues to benefit from skilled PT services and is not progressing towards goals. Pt received sitting in recliner chair and agreeable to mobilize.  Pt requires Min A x 1 for transfers and ambulation with rolling walker with no reports of dizziness with positional changes or ambulation.  Pt c/o increased pain with activity, 10/10, and only  tolerated ambulation x 5 feet today.  Pt ambulates with decreased pace, delayed, and rigid in BLE due to increased pelvic pain. Note pt had pain medication earlier today and not due for another dose for another hour.  Pt returned to chair and repositioned to reclined position.           PLAN:  Patient continues to benefit from skilled intervention to address the above impairments.  Continue treatment per established plan of care.      Recommendation for discharge: (in order for the patient to meet his/her long term goals): Therapy up to 5 days/week in Skilled nursing facility, may need to consider group home as long term plan to frequent falls     Other factors to consider for discharge: lives alone, poor safety awareness, impaired cognition, high risk for falls, not safe to be alone, and concern for safely navigating or managing the home environment       IF patient discharges home will need the following DME: continuing to assess with progress, recommend life alert or similar device due to frequent falls        SUBJECTIVE:   Patient stated, \"I can't walk anymore.\"    OBJECTIVE DATA SUMMARY:   Critical Behavior:  Orientation  Overall Orientation Status: Within Normal Limits  Orientation Level: Oriented X4  Cognition  Overall Cognitive Status: Exceptions  Arousal/Alertness: Appropriate responses to stimuli  Following Commands: Follows one step commands with increased time;Follows one step commands consistently  Attention Span: Attends with cues to redirect  Memory: Decreased recall of precautions  Safety Judgement: Decreased awareness of need for assistance;Decreased awareness of need for safety  Insights: Decreased awareness of deficits  Initiation: Requires cues for all  Sequencing: Requires cues for all    Functional Mobility Training:  Bed Mobility:   Not assessed, OOB  Transfers:  Transfer Training  Interventions: Safety awareness training;Verbal cues;Tactile cues  Sit to Stand: Minimum assistance;Assist X1;Additional

## 2024-04-24 NOTE — ACP (ADVANCE CARE PLANNING)
Advance Care Planning     General Advance Care Planning (ACP) Conversation    Date of Conversation: 4/24/2024  Conducted with: Patient with Decision Making Capacity  Other persons present: None    Healthcare Decision Maker:  No healthcare decision makers have been documented.  Click here to complete HealthCare Decision Makers including selection of the Healthcare Decision Maker Relationship (ie \"Primary\")   Today we discussed Healthcare Decision Makers. The patient is considering options.    Content/Action Overview:  Patient is ready to consider care preferences-refer to ACP Clinical Specialist  Reviewed DNR/DNI and patient elects Full Code (Attempt Resuscitation)  treatment goals, hospitalization preferences, and resuscitation preferences     Patient would like to name her best friend as medical power of  to make decisions on her behalf if she cannot. She does not have official documentation of this wish/desire. States she has no local family, although in demographics there is a \"daughter\" listed.  Referral placed to  services to complete AMD.     Length of Voluntary ACP Conversation in minutes:  16 minutes    ROGELIO Briceño - NP

## 2024-04-24 NOTE — CARE COORDINATION
Transition of Care Plan:    RUR: 14%  Prior Level of Functioning: independent  Disposition: SNF [facility]  If SNF or IPR: Date FOC offered: 4/22  Date FOC received: 4/23  Accepting facility: [facility]  Date authorization started with reference number: SNF has started auth 4/23 Reference #[id number]   Follow up appointments: Per physician recommendation   DME needed: Defer to SNF  Transportation at discharge: stretcher   IM/IMM Medicare/ letter given:   Caregiver Contact: daughter Ariadna Jauregui [phone]  Barriers to discharge: medical stability, auth    CM reviewed chart. CM met with patient to discuss SNF choices. Patient would like to stay somewhere close to her home address, CM assisted patient in narrowing down the SNF choices by location and was able to provide CM with choices.     CM sent SNF referrals to: [facility].    CM will continue to follow.    [facility] started auth 4/23, CM faxed clinicals over to [facility] [phone] 4/24.    Patients insurance auth is approved per facility, patient will have a bed available tomorrow, going to Room 112, report number is [phone]. CM will set up transportation tomorrow morning and will provide further information in the SNF note tomorrow for discharge.     Alice Bose RN/CRM

## 2024-04-24 NOTE — PROGRESS NOTES
[facility] Adult  Hospitalist Group                                                                                          Hospitalist Progress Note  ROGELIO Briceño - NP  Answering service: [phone] OR [phone] from in house phone        Date of Service:  2024  NAME:  Erin Galindo  :  1950  MRN:  [id number]       Admission Summary:     Erin Galindo is a 73 y.o. female with h/o R breast CA s/p lumpectomy/chemo/XRT (Onc Dr Sun), CKD Stage 3 (Renal Dr John Land), depression, T2DM (Endo Dr Segura) with retinopathy, DJD, SALEH, hearing deficit, HTN, HLD, insomnia, chronic back pain (Ortho Dr Alcocer), pancreatic pseudocyst, obesity s/p gastric bypass, R vision loss s/p childhood trauma, and vitamin D deficiency who was BIBEMS from home s/p GLF with head trauma, dizziness, and R  hip pain. Pt was getting up to use the bathroom using her walker this morning, was very dizzy, then fell. She hit the top and right side of her head on the TV, denied LOC, but was unable to get up. She stayed on the floor for about 1 hour until she was able to call 911 from the phone on the floor. She also hit her R shoulder. She c/o diarrhea, numbness/tingling all over. She denies f/c/n/v, CP, abdo pain, SOB, cough, recent COVID/flu, dysuria. Pt lives alone. In the ED, CTAP showed acute R inferior pubic ramus fracture. Ortho Dr Aguilera was called by the ED MD and recommended nonoperative management, WBAT, PT/OT evals.        Interval history / Subjective:     F/u non operable R pubic ramus fx   Patient lives alone, walks with RW at baseline, mobility further impaired with recent pubic fracture, SNF auth pending for discharge, has been accepted at [facility].          Assessment & Plan:     Acute inferior R pubic ramus fracture  DJD  Chronic back pain, follows with Ly Alcocer  Vitamin D deficiency  S/p recurrent falls  - admit inpatient remote telemetry on [facility]  - per Ortho, nonoperative

## 2024-04-24 NOTE — PROGRESS NOTES
Patient is impulsive and attempts to get up ignoring staff. ALL ALARMS ARE ON! She still is attempting to get up.

## 2024-04-24 NOTE — PROGRESS NOTES
Lovenox Monitoring  Indication: DVT Prophylaxis  Recent Labs     04/22/24  0837 04/23/24  0343 04/24/24  0323   HGB 9.5* 9.1* 9.2*    142* 140*     Current Weight: 70.7 kg  Est. CrCl = 29 ml/min  Current Dose: 40 mg subcutaneously every 24 hours.  Plan: Change to Lovenox 30mg q24h with respect to indication and renal status (CrCl 15-29 mL/min) and weight .9kg per [facility]/[employer]-approved Renal Dosing Adjustment protocol.  Pharmacy will continue to monitor this patient daily for changes in clinical status and renal function.

## 2024-04-24 NOTE — PROGRESS NOTES
End of Shift Note    Bedside shift change report given to Eloise ELIZONDO (oncoming nurse) by Mana Cr LPN (offgoing nurse).  Report included the following information SBAR, Kardex, Intake/Output, and MAR    Shift worked:  0730-2000     Shift summary and any significant changes:    Room air   Up with 1 assist (patient very unsteady on her feet) Bed alarm on   Patient is easily redirected but still will attempt to get out of bed  Patient confused has pulled out 2 iv's today.   Tolerated diet   Tolerated medication    Concerns for physician to address: None    Zone phone for oncoming shift:  None        Activity:     Number times ambulated in hallways past shift: 0  Number of times OOB to chair past shift: 1    Cardiac:   Cardiac Monitoring: No           Access:  Current line(s): PIV     Genitourinary:   Urinary status: voiding and external catheter    Respiratory:      Chronic home O2 use?: NO  Incentive spirometer at bedside: YES       GI:     Current diet:  ADULT DIET; Regular; 5 carb choices (75 gm/meal)  DIET ONE TIME MESSAGE;  Passing flatus: YES  Tolerating current diet: YES       Pain Management:   Patient states pain is manageable on current regimen: YES    Skin:     Interventions: turn team, specialty bed, float heels, increase time out of bed, and foam dressing    Patient Safety:  Fall Score:    Interventions: bed/chair alarm, assistive device (walker, cane. etc), gripper socks, pt to call before getting OOB, and stay with me (per policy)       Length of Stay:  Expected LOS: 4  Actual LOS: 3      Mana Cr LPN

## 2024-04-25 VITALS
WEIGHT: 155.87 LBS | DIASTOLIC BLOOD PRESSURE: 69 MMHG | SYSTOLIC BLOOD PRESSURE: 159 MMHG | OXYGEN SATURATION: 95 % | RESPIRATION RATE: 16 BRPM | BODY MASS INDEX: 30.6 KG/M2 | HEIGHT: 60 IN | TEMPERATURE: 98.1 F | HEART RATE: 64 BPM

## 2024-04-25 LAB
GLUCOSE BLD STRIP.AUTO-MCNC: 109 MG/DL (ref 65–117)
GLUCOSE BLD STRIP.AUTO-MCNC: 111 MG/DL (ref 65–117)
SERVICE CMNT-IMP: NORMAL
SERVICE CMNT-IMP: NORMAL

## 2024-04-25 PROCEDURE — 6360000002 HC RX W HCPCS: Performed by: NURSE PRACTITIONER

## 2024-04-25 PROCEDURE — 6370000000 HC RX 637 (ALT 250 FOR IP)

## 2024-04-25 PROCEDURE — 82962 GLUCOSE BLOOD TEST: CPT

## 2024-04-25 PROCEDURE — 6370000000 HC RX 637 (ALT 250 FOR IP): Performed by: INTERNAL MEDICINE

## 2024-04-25 RX ORDER — TRAMADOL HYDROCHLORIDE 50 MG/1
50 TABLET ORAL EVERY 8 HOURS PRN
Qty: 9 TABLET | Refills: 0 | Status: SHIPPED | OUTPATIENT
Start: 2024-04-25 | End: 2024-04-28

## 2024-04-25 RX ORDER — INSULIN LISPRO 100 [IU]/ML
3 INJECTION, SOLUTION INTRAVENOUS; SUBCUTANEOUS
Qty: 1 EACH | Refills: 0 | Status: SHIPPED | OUTPATIENT
Start: 2024-04-25

## 2024-04-25 RX ORDER — INSULIN GLARGINE 100 [IU]/ML
8 INJECTION, SOLUTION SUBCUTANEOUS NIGHTLY
Qty: 10 ML | Refills: 3 | Status: SHIPPED | OUTPATIENT
Start: 2024-04-25

## 2024-04-25 RX ORDER — INSULIN LISPRO 100 [IU]/ML
0-4 INJECTION, SOLUTION INTRAVENOUS; SUBCUTANEOUS
Qty: 1 EACH | Refills: 0 | Status: SHIPPED | OUTPATIENT
Start: 2024-04-25

## 2024-04-25 RX ORDER — SENNA AND DOCUSATE SODIUM 50; 8.6 MG/1; MG/1
1 TABLET, FILM COATED ORAL 2 TIMES DAILY
Qty: 60 TABLET | Refills: 0 | Status: SHIPPED | OUTPATIENT
Start: 2024-04-25

## 2024-04-25 RX ADMIN — SENNOSIDES AND DOCUSATE SODIUM 1 TABLET: 8.6; 5 TABLET ORAL at 09:20

## 2024-04-25 RX ADMIN — ENOXAPARIN SODIUM 30 MG: 100 INJECTION SUBCUTANEOUS at 09:20

## 2024-04-25 RX ADMIN — METOPROLOL TARTRATE 25 MG: 25 TABLET, FILM COATED ORAL at 09:20

## 2024-04-25 RX ADMIN — ESCITALOPRAM OXALATE 10 MG: 10 TABLET ORAL at 09:20

## 2024-04-25 RX ADMIN — INSULIN LISPRO 3 UNITS: 100 INJECTION, SOLUTION INTRAVENOUS; SUBCUTANEOUS at 09:20

## 2024-04-25 RX ADMIN — BUPROPION HYDROCHLORIDE 300 MG: 300 TABLET, EXTENDED RELEASE ORAL at 09:20

## 2024-04-25 RX ADMIN — TRAMADOL HYDROCHLORIDE 50 MG: 50 TABLET, COATED ORAL at 04:53

## 2024-04-25 ASSESSMENT — PAIN DESCRIPTION - LOCATION: LOCATION: PELVIS

## 2024-04-25 ASSESSMENT — PAIN SCALES - GENERAL: PAINLEVEL_OUTOF10: 7

## 2024-04-25 NOTE — CARE COORDINATION
Transition of Care Plan:    RUR: 14%  Prior Level of Functioning: independent  Disposition: SNF [facility]  If SNF or IPR: Date FOC offered: 4/22  Date FOC received: 4/23  Accepting facility: [facility]  Date authorization started with reference number: SNF has started auth 4/23 Reference #[id number]   Follow up appointments: Per physician recommendation   DME needed: Defer to SNF  Transportation at discharge: stretcher   IM/IMM Medicare/ letter given:   Caregiver Contact: daughter Ariadna Jauregui [phone]    4/24: Patients insurance auth is approved per facility, patient will have a bed available tomorrow, going to Room 112, report number is [phone]. CM will set up transportation tomorrow morning and will provide further information in the SNF note tomorrow for discharge.     Transition of Care Plan to SNF/Rehab    Communication to Patient/Family:  Met with patient and family and they are agreeable to the transition plan. The Plan for Transition of Care is related to the following treatment goals: skilled nursing facility    The Patient and/or patient representative was provided with a choice of provider and agrees  with the discharge plan.      Yes [x] No []    A Freedom of choice list was provided with basic dialogue that supports the patient's individualized plan of care/goals and shares the quality data associated with the providers.       Yes [x] No []    SNF/Rehab Transition:  Patient has been accepted to [facility] and meets criteria for admission.   Patient will transported by [facility] and expected to leave at 1:15PM    Communication to SNF/Rehab:  Bedside RN, Sussy, has been notified to update the transition plan to the facility and call report ([phone]).  Discharge information has been updated on the AVS. And communicated to facility via [employer], or CC link.     [facility]    Nursing Please include all hard scripts for  controlled substances, med rec and dc summary, and AVS in packet.     Reviewed and confirmed with facility, [facility], can manage the patient care needs for the following:     Dom with (X) only those applicable:  Medication:  [x]Medications are available at the facility  []IV Antibiotics    []Controlled Substance - hard copies available sent.  []Weekly Labs    Equipment:  []CPAP/BiPAP  []Wound Vacuum  []Sommers or Urinary Device  []PICC/Central Line  []Nebulizer  []Ventilator    Treatment:  []Isolation (for MRSA, VRE, etc.)  []Surgical Drain Management  []Tracheostomy Care  []Dressing Changes  []Dialysis with transportation  []PEG Care  []Oxygen  []Daily Weights for Heart Failure    Dietary:  []Any diet limitations  []Tube Feedings   []Total Parenteral Management (TPN)    Financial Resources:  []Medicaid Application Completed    []UAI Completed and copy given to pt/family  and copy given to pt/family  []A screening has previously been completed.    []Level II Completed    [] Private pay individual who will not become   financially eligible for Medicaid within 6 months from admission to a [facility].     [] Individual refused to have screening conducted.     []Medicaid Application Completed    [x]The screening denied because it was determined individual did not need/did not qualify for nursing facility level of care.  [] Out of state residents seeking direct admission to a VA nursing facility.  [] Individuals who are inpatients of an out of state hospital, or in state or out of state veterans/ hospital and seek direct admission to a VA nursing facility  [] Individuals who are pateints or residents of a state owned/operated facility that is licensed by Department of Behavioral Services (DBHDS) and seek direct admission to VA nursing facility  [] A screening not required for enrollment in Medicaid Hospice services as set out in 12 VAC 30-  [] [facility]) staff shall

## 2024-04-25 NOTE — PROGRESS NOTES
Advance Care Planning     Advance Care Planning Inpatient Note  Spiritual Care Department    Today's Date: 4/25/2024  Unit: [facility] 5S ORTHO JOINT    Received request from IDT Member.  Upon review of chart and communication with care team, Spiritual Care will defer advance care planning with patient at this time.. Patient was/were present in the room during visit.    Goals of ACP Conversation:  Conversation deferred    Health Care Decision Makers:     No healthcare decision makers have been documented.  Click here to complete HealthCare Decision Makers including selection of the Healthcare Decision Maker Relationship (ie \"Primary\")  Summary:  No Decision Maker named by patient at this time    Advance Care Planning Documents (Patient Wishes):  None     Assessment:  Patient appeared to be confused. Per conversation with RN, patient has appeared somewhat confused and she does not recommended Advance Directive be completed at this time.     Interventions:  None    Care Preferences Communicated:   No    Outcomes/Plan:  none    Electronically signed by REV. ANTWON MORAN M.Div, [facility] on 4/25/2024 at 10:33 AM

## 2024-04-25 NOTE — PROGRESS NOTES
Referral source:  initiated visit to Erin Galindo at [facility] in [facility] 5S ORTHO JOINT. I reviewed the medical record prior to this encounter.     Spiritual Assessment: Request for Advance Directive    Received request from IDT Member.  Upon review of chart and conversation with bedside RN Radha (patient has appeared confused), Spiritual Care will defer advance care planning with patient at this time.    Plan of Care: Please contact spiritual care for future services.      jennifer Pretty MDiv, [facility]   paging Service [phone] ([phone])

## 2024-04-25 NOTE — DISCHARGE INSTRUCTIONS
Discharge Summary       PATIENT ID: Erin Galindo  MRN: [id number]   YOB: 1950    DATE OF ADMISSION: 4/21/2024  9:06 AM    DATE OF DISCHARGE: 4/25/2024  PRIMARY CARE PROVIDER: Hector Maddox MD     ATTENDING PHYSICIAN: Dr. Beaver   DISCHARGING PROVIDER: ROGELIO Briceño NP    To contact this individual call [phone] and ask the  to page.  If unavailable ask to be transferred the Adult Hospitalist Department.    CONSULTATIONS: IP CONSULT TO ORTHOPEDIC SURGERY  IP CONSULT TO HOSPITALIST  IP CONSULT TO CASE MANAGEMENT  IP CONSULT TO SPIRITUAL SERVICES    PROCEDURES/SURGERIES: * No surgery found *     ADMITTING DIAGNOSES & HOSPITAL COURSE:     Erin Galindo is a 73 y.o. female with h/o R breast CA s/p lumpectomy/chemo/XRT (Onc Dr Sun), CKD Stage 3 (Renal Dr [place]), depression, T2DM (Endo Dr Segura) with retinopathy, DJD, SALEH, hearing deficit, HTN, HLD, insomnia, chronic back pain (Ortho Dr Alcocer), pancreatic pseudocyst, obesity s/p gastric bypass, R vision loss s/p childhood trauma, and vitamin D deficiency who was BIBEMS from home s/p GLF with head trauma, dizziness, and R  hip pain. Pt was getting up to use the bathroom using her walker this morning, was very dizzy, then fell. She hit the top and right side of her head on the TV, denied LOC, but was unable to get up. She stayed on the floor for about 1 hour until she was able to call 911 from the phone on the floor. She also hit her R shoulder. She c/o diarrhea, numbness/tingling all over. She denies f/c/n/v, CP, abdo pain, SOB, cough, recent COVID/flu, dysuria. Pt lives alone. In the ED, CTAP showed acute R inferior pubic ramus fracture. Ortho Dr Aguilera was called by the ED MD and recommended nonoperative management, WBAT, PT/OT evals.      Patient was treated with PT/OT and pain management during this hospitalization. She did tend to get more confused with administration of tramadol. Otherwise she is relatively  taking these medications      Levemir FlexTouch 100 UNIT/ML injection pen  Generic drug: insulin detemir  Replaced by: insulin glargine 100 UNIT/ML injection vial               Where to Get Your Medications        Information about where to get these medications is not yet available    Ask your nurse or doctor about these medications  insulin glargine 100 UNIT/ML injection vial  insulin lispro 100 UNIT/ML Soln injection vial  insulin lispro 100 UNIT/ML Soln injection vial  sennosides-docusate sodium 8.6-50 MG tablet  traMADol 50 MG tablet           NOTIFY YOUR PHYSICIAN FOR ANY OF THE FOLLOWING:   Fever over 101 degrees for 24 hours.   Chest pain, shortness of breath, fever, chills, nausea, vomiting, diarrhea, change in mentation, falling, weakness, bleeding. Severe pain or pain not relieved by medications.  Or, any other signs or symptoms that you may have questions about.    DISPOSITION:    Home With:   OT  PT  HH  RN      X Long term SNF/Inpatient Rehab    Independent/assisted living    Hospice    Other:       PATIENT CONDITION AT DISCHARGE:     Functional status    Poor    X Deconditioned     Independent      Cognition     Lucid    X Forgetful     Dementia      Catheters/lines (plus indication)    Sommers     PICC     PEG    X None      Code status    X Full code     DNR      PHYSICAL EXAMINATION AT DISCHARGE:    General : alert x 3, awake, no acute distress,   HEENT: PEERL, EOMI, moist mucus membrane  Neck: supple, no JVD, no meningeal signs  Chest: Clear to auscultation bilaterally   CVS: S1 S2 heard, Capillary refill less than 2 seconds  Abd: soft/ Non tender, non distended, BS physiological,   Ext: no clubbing, no cyanosis, no edema, brisk 2+ DP pulses  Neuro/Psych: pleasant mood and affect, CN 2-12 grossly intact, sensory grossly within normal limit, Strength 5/5 in all extremities  Skin: warm     CHRONIC MEDICAL DIAGNOSES:  Principal Problem:    Pubic ramus fracture, right, closed, initial encounter

## 2024-04-25 NOTE — DISCHARGE SUMMARY
Assessment: Acne Comments (Sticky): ageless cleanse,scrub and irwin under steam, extractions, mandelic,phyto mask, epidermal repair and pca spf Detail Level: Zone Price (Use Numbers Only, No Special Characters Or $): 73 taking these medications      Levemir FlexTouch 100 UNIT/ML injection pen  Generic drug: insulin detemir  Replaced by: insulin glargine 100 UNIT/ML injection vial               Where to Get Your Medications        Information about where to get these medications is not yet available    Ask your nurse or doctor about these medications  insulin glargine 100 UNIT/ML injection vial  insulin lispro 100 UNIT/ML Soln injection vial  insulin lispro 100 UNIT/ML Soln injection vial  sennosides-docusate sodium 8.6-50 MG tablet  traMADol 50 MG tablet           NOTIFY YOUR PHYSICIAN FOR ANY OF THE FOLLOWING:   Fever over 101 degrees for 24 hours.   Chest pain, shortness of breath, fever, chills, nausea, vomiting, diarrhea, change in mentation, falling, weakness, bleeding. Severe pain or pain not relieved by medications.  Or, any other signs or symptoms that you may have questions about.    DISPOSITION:    Home With:   OT  PT  HH  RN      X Long term SNF/Inpatient Rehab    Independent/assisted living    Hospice    Other:       PATIENT CONDITION AT DISCHARGE:     Functional status    Poor    X Deconditioned     Independent      Cognition     Lucid    X Forgetful     Dementia      Catheters/lines (plus indication)    Sommers     PICC     PEG    X None      Code status    X Full code     DNR      PHYSICAL EXAMINATION AT DISCHARGE:    General : alert x 3, awake, no acute distress,   HEENT: PEERL, EOMI, moist mucus membrane  Neck: supple, no JVD, no meningeal signs  Chest: Clear to auscultation bilaterally   CVS: S1 S2 heard, Capillary refill less than 2 seconds  Abd: soft/ Non tender, non distended, BS physiological,   Ext: no clubbing, no cyanosis, no edema, brisk 2+ DP pulses  Neuro/Psych: pleasant mood and affect, CN 2-12 grossly intact, sensory grossly within normal limit, Strength 5/5 in all extremities  Skin: warm     CHRONIC MEDICAL DIAGNOSES:  Principal Problem:    Pubic ramus fracture, right, closed, initial encounter  (HCC)  Resolved Problems:    * No resolved hospital problems. *        Greater than 31 minutes were spent with the patient on counseling and coordination of care    Signed:   ROGELIO Briceño NP  4/25/2024  12:19 PM      Facial Steaming: steamed Exfoliation Type: enzyme Mask Type (Optional): sensitive Extraction Method: cotton-tipped applicator Treatment Type (Optional): Deep Cleanse Treatment

## 2024-05-31 ENCOUNTER — TELEPHONE (OUTPATIENT)
Age: 74
End: 2024-05-31

## 2024-05-31 NOTE — TELEPHONE ENCOUNTER
Mercedes from [facility] called hoping to find out if Dr. Lantigua will sign and follow patient into home health.    Best call back number  [phone]

## 2024-06-22 ENCOUNTER — APPOINTMENT (OUTPATIENT)
Facility: HOSPITAL | Age: 74
DRG: 948 | End: 2024-06-22
Payer: MEDICARE

## 2024-06-22 ENCOUNTER — HOSPITAL ENCOUNTER (INPATIENT)
Facility: HOSPITAL | Age: 74
LOS: 2 days | Discharge: HOME | DRG: 948 | End: 2024-06-24
Attending: EMERGENCY MEDICINE | Admitting: HOSPITALIST
Payer: MEDICARE

## 2024-06-22 DIAGNOSIS — E87.5 HYPERKALEMIA: ICD-10-CM

## 2024-06-22 DIAGNOSIS — S09.90XA CLOSED HEAD INJURY, INITIAL ENCOUNTER: ICD-10-CM

## 2024-06-22 DIAGNOSIS — S62.609A CLOSED FRACTURE OF PHALANX OF DIGIT OF HAND, INITIAL ENCOUNTER: ICD-10-CM

## 2024-06-22 DIAGNOSIS — R41.82 ALTERED MENTAL STATUS, UNSPECIFIED ALTERED MENTAL STATUS TYPE: Primary | ICD-10-CM

## 2024-06-22 DIAGNOSIS — N17.9 ACUTE KIDNEY INJURY (HCC): ICD-10-CM

## 2024-06-22 LAB
ALBUMIN SERPL-MCNC: 3.6 G/DL (ref 3.5–5)
ALBUMIN/GLOB SERPL: 0.9 (ref 1.1–2.2)
ALP SERPL-CCNC: 187 U/L (ref 45–117)
ALT SERPL-CCNC: 28 U/L (ref 12–78)
ANION GAP SERPL CALC-SCNC: 5 MMOL/L (ref 5–15)
APPEARANCE UR: CLEAR
AST SERPL-CCNC: 35 U/L (ref 15–37)
BACTERIA URNS QL MICRO: NEGATIVE /HPF
BILIRUB SERPL-MCNC: 0.6 MG/DL (ref 0.2–1)
BILIRUB UR QL: NEGATIVE
BUN SERPL-MCNC: 42 MG/DL (ref 6–20)
BUN/CREAT SERPL: 20 (ref 12–20)
CALCIUM SERPL-MCNC: 9.1 MG/DL (ref 8.5–10.1)
CHLORIDE SERPL-SCNC: 112 MMOL/L (ref 97–108)
CO2 SERPL-SCNC: 23 MMOL/L (ref 21–32)
COLOR UR: ABNORMAL
COMMENT:: NORMAL
CREAT SERPL-MCNC: 2.06 MG/DL (ref 0.55–1.02)
EKG ATRIAL RATE: 101 BPM
EKG DIAGNOSIS: NORMAL
EKG P AXIS: 48 DEGREES
EKG P-R INTERVAL: 154 MS
EKG Q-T INTERVAL: 336 MS
EKG QRS DURATION: 66 MS
EKG QTC CALCULATION (BAZETT): 435 MS
EKG R AXIS: 7 DEGREES
EKG T AXIS: 48 DEGREES
EKG VENTRICULAR RATE: 101 BPM
EPITH CASTS URNS QL MICRO: ABNORMAL /LPF
ERYTHROCYTE [DISTWIDTH] IN BLOOD BY AUTOMATED COUNT: 15 % (ref 11.5–14.5)
GLOBULIN SER CALC-MCNC: 3.8 G/DL (ref 2–4)
GLUCOSE BLD STRIP.AUTO-MCNC: 132 MG/DL (ref 65–117)
GLUCOSE SERPL-MCNC: 148 MG/DL (ref 65–100)
GLUCOSE UR STRIP.AUTO-MCNC: NEGATIVE MG/DL
HCT VFR BLD AUTO: 32.3 % (ref 35–47)
HGB BLD-MCNC: 9.8 G/DL (ref 11.5–16)
HGB UR QL STRIP: NEGATIVE
HYALINE CASTS URNS QL MICRO: ABNORMAL /LPF (ref 0–5)
KETONES UR QL STRIP.AUTO: NEGATIVE MG/DL
LACTATE SERPL-SCNC: 1 MMOL/L (ref 0.4–2)
LEUKOCYTE ESTERASE UR QL STRIP.AUTO: ABNORMAL
MCH RBC QN AUTO: 27.5 PG (ref 26–34)
MCHC RBC AUTO-ENTMCNC: 30.3 G/DL (ref 30–36.5)
MCV RBC AUTO: 90.7 FL (ref 80–99)
NITRITE UR QL STRIP.AUTO: NEGATIVE
NRBC # BLD: 0 K/UL (ref 0–0.01)
NRBC BLD-RTO: 0 PER 100 WBC
PH UR STRIP: 5 (ref 5–8)
PLATELET # BLD AUTO: 224 K/UL (ref 150–400)
PMV BLD AUTO: 9.5 FL (ref 8.9–12.9)
POTASSIUM SERPL-SCNC: 5.2 MMOL/L (ref 3.5–5.1)
PROT SERPL-MCNC: 7.4 G/DL (ref 6.4–8.2)
PROT UR STRIP-MCNC: NEGATIVE MG/DL
RBC # BLD AUTO: 3.56 M/UL (ref 3.8–5.2)
RBC #/AREA URNS HPF: ABNORMAL /HPF (ref 0–5)
SERVICE CMNT-IMP: ABNORMAL
SODIUM SERPL-SCNC: 140 MMOL/L (ref 136–145)
SP GR UR REFRACTOMETRY: 1.02 (ref 1–1.03)
SPECIMEN HOLD: NORMAL
TROPONIN I SERPL HS-MCNC: 5 NG/L (ref 0–51)
UROBILINOGEN UR QL STRIP.AUTO: 1 EU/DL (ref 0.2–1)
WBC # BLD AUTO: 6.6 K/UL (ref 3.6–11)
WBC URNS QL MICRO: ABNORMAL /HPF (ref 0–4)

## 2024-06-22 PROCEDURE — 81001 URINALYSIS AUTO W/SCOPE: CPT

## 2024-06-22 PROCEDURE — 93005 ELECTROCARDIOGRAM TRACING: CPT | Performed by: EMERGENCY MEDICINE

## 2024-06-22 PROCEDURE — 84484 ASSAY OF TROPONIN QUANT: CPT

## 2024-06-22 PROCEDURE — 72125 CT NECK SPINE W/O DYE: CPT

## 2024-06-22 PROCEDURE — 85027 COMPLETE CBC AUTOMATED: CPT

## 2024-06-22 PROCEDURE — 87040 BLOOD CULTURE FOR BACTERIA: CPT

## 2024-06-22 PROCEDURE — 29125 APPL SHORT ARM SPLINT STATIC: CPT

## 2024-06-22 PROCEDURE — 72192 CT PELVIS W/O DYE: CPT

## 2024-06-22 PROCEDURE — 70450 CT HEAD/BRAIN W/O DYE: CPT

## 2024-06-22 PROCEDURE — 6360000002 HC RX W HCPCS: Performed by: NURSE PRACTITIONER

## 2024-06-22 PROCEDURE — 99285 EMERGENCY DEPT VISIT HI MDM: CPT

## 2024-06-22 PROCEDURE — 2580000003 HC RX 258: Performed by: NURSE PRACTITIONER

## 2024-06-22 PROCEDURE — 36415 COLL VENOUS BLD VENIPUNCTURE: CPT

## 2024-06-22 PROCEDURE — 6370000000 HC RX 637 (ALT 250 FOR IP): Performed by: NURSE PRACTITIONER

## 2024-06-22 PROCEDURE — 1100000000 HC RM PRIVATE

## 2024-06-22 PROCEDURE — 80053 COMPREHEN METABOLIC PANEL: CPT

## 2024-06-22 PROCEDURE — 82962 GLUCOSE BLOOD TEST: CPT

## 2024-06-22 PROCEDURE — 2580000003 HC RX 258: Performed by: EMERGENCY MEDICINE

## 2024-06-22 PROCEDURE — 99282 EMERGENCY DEPT VISIT SF MDM: CPT | Performed by: PHYSICIAN ASSISTANT

## 2024-06-22 PROCEDURE — 73130 X-RAY EXAM OF HAND: CPT

## 2024-06-22 PROCEDURE — 71045 X-RAY EXAM CHEST 1 VIEW: CPT

## 2024-06-22 PROCEDURE — 83605 ASSAY OF LACTIC ACID: CPT

## 2024-06-22 RX ORDER — ATORVASTATIN CALCIUM 10 MG/1
10 TABLET, FILM COATED ORAL DAILY
COMMUNITY

## 2024-06-22 RX ORDER — SODIUM CHLORIDE 0.9 % (FLUSH) 0.9 %
5-40 SYRINGE (ML) INJECTION EVERY 12 HOURS SCHEDULED
Status: DISCONTINUED | OUTPATIENT
Start: 2024-06-22 | End: 2024-06-24 | Stop reason: HOSPADM

## 2024-06-22 RX ORDER — HEPARIN SODIUM 5000 [USP'U]/ML
5000 INJECTION, SOLUTION INTRAVENOUS; SUBCUTANEOUS EVERY 8 HOURS SCHEDULED
Status: DISCONTINUED | OUTPATIENT
Start: 2024-06-22 | End: 2024-06-24 | Stop reason: HOSPADM

## 2024-06-22 RX ORDER — ESCITALOPRAM OXALATE 10 MG/1
10 TABLET ORAL DAILY
Status: DISCONTINUED | OUTPATIENT
Start: 2024-06-23 | End: 2024-06-24 | Stop reason: HOSPADM

## 2024-06-22 RX ORDER — ONDANSETRON 2 MG/ML
4 INJECTION INTRAMUSCULAR; INTRAVENOUS EVERY 6 HOURS PRN
Status: DISCONTINUED | OUTPATIENT
Start: 2024-06-22 | End: 2024-06-24 | Stop reason: HOSPADM

## 2024-06-22 RX ORDER — LOSARTAN POTASSIUM 50 MG/1
50 TABLET ORAL DAILY
COMMUNITY

## 2024-06-22 RX ORDER — ACETAMINOPHEN 650 MG/1
650 SUPPOSITORY RECTAL EVERY 6 HOURS PRN
Status: DISCONTINUED | OUTPATIENT
Start: 2024-06-22 | End: 2024-06-22

## 2024-06-22 RX ORDER — ATORVASTATIN CALCIUM 10 MG/1
10 TABLET, FILM COATED ORAL DAILY
Status: DISCONTINUED | OUTPATIENT
Start: 2024-06-22 | End: 2024-06-24 | Stop reason: HOSPADM

## 2024-06-22 RX ORDER — SODIUM CHLORIDE 9 MG/ML
INJECTION, SOLUTION INTRAVENOUS PRN
Status: DISCONTINUED | OUTPATIENT
Start: 2024-06-22 | End: 2024-06-24 | Stop reason: HOSPADM

## 2024-06-22 RX ORDER — POLYETHYLENE GLYCOL 3350 17 G/17G
17 POWDER, FOR SOLUTION ORAL DAILY PRN
Status: DISCONTINUED | OUTPATIENT
Start: 2024-06-22 | End: 2024-06-24 | Stop reason: HOSPADM

## 2024-06-22 RX ORDER — BUPROPION HYDROCHLORIDE 300 MG/1
300 TABLET ORAL DAILY
Status: DISCONTINUED | OUTPATIENT
Start: 2024-06-23 | End: 2024-06-24 | Stop reason: HOSPADM

## 2024-06-22 RX ORDER — SODIUM CHLORIDE 0.9 % (FLUSH) 0.9 %
5-40 SYRINGE (ML) INJECTION PRN
Status: DISCONTINUED | OUTPATIENT
Start: 2024-06-22 | End: 2024-06-24 | Stop reason: HOSPADM

## 2024-06-22 RX ORDER — ACETAMINOPHEN 325 MG/1
650 TABLET ORAL EVERY 6 HOURS PRN
Status: DISCONTINUED | OUTPATIENT
Start: 2024-06-22 | End: 2024-06-22

## 2024-06-22 RX ORDER — ONDANSETRON 4 MG/1
4 TABLET, ORALLY DISINTEGRATING ORAL EVERY 8 HOURS PRN
Status: DISCONTINUED | OUTPATIENT
Start: 2024-06-22 | End: 2024-06-24 | Stop reason: HOSPADM

## 2024-06-22 RX ORDER — ACETAMINOPHEN 500 MG
1000 TABLET ORAL EVERY 8 HOURS SCHEDULED
Status: DISCONTINUED | OUTPATIENT
Start: 2024-06-22 | End: 2024-06-24 | Stop reason: HOSPADM

## 2024-06-22 RX ORDER — SODIUM CHLORIDE 9 MG/ML
INJECTION, SOLUTION INTRAVENOUS CONTINUOUS
Status: DISCONTINUED | OUTPATIENT
Start: 2024-06-22 | End: 2024-06-23

## 2024-06-22 RX ORDER — 0.9 % SODIUM CHLORIDE 0.9 %
1000 INTRAVENOUS SOLUTION INTRAVENOUS ONCE
Status: COMPLETED | OUTPATIENT
Start: 2024-06-22 | End: 2024-06-22

## 2024-06-22 RX ADMIN — SODIUM CHLORIDE 1000 ML: 9 INJECTION, SOLUTION INTRAVENOUS at 11:26

## 2024-06-22 RX ADMIN — SODIUM CHLORIDE, PRESERVATIVE FREE 10 ML: 5 INJECTION INTRAVENOUS at 20:26

## 2024-06-22 RX ADMIN — ACETAMINOPHEN 1000 MG: 500 TABLET ORAL at 20:24

## 2024-06-22 RX ADMIN — HEPARIN SODIUM 5000 UNITS: 5000 INJECTION INTRAVENOUS; SUBCUTANEOUS at 15:13

## 2024-06-22 RX ADMIN — ATORVASTATIN CALCIUM 10 MG: 10 TABLET, FILM COATED ORAL at 17:02

## 2024-06-22 RX ADMIN — METOPROLOL TARTRATE 25 MG: 25 TABLET, FILM COATED ORAL at 20:24

## 2024-06-22 RX ADMIN — HEPARIN SODIUM 5000 UNITS: 5000 INJECTION INTRAVENOUS; SUBCUTANEOUS at 21:52

## 2024-06-22 RX ADMIN — SODIUM CHLORIDE: 9 INJECTION, SOLUTION INTRAVENOUS at 15:12

## 2024-06-22 RX ADMIN — ACETAMINOPHEN 1000 MG: 500 TABLET ORAL at 17:01

## 2024-06-22 ASSESSMENT — PAIN DESCRIPTION - DESCRIPTORS
DESCRIPTORS: ACHING
DESCRIPTORS_3: ACHING
DESCRIPTORS_2: ACHING
DESCRIPTORS: ACHING
DESCRIPTORS: DISCOMFORT

## 2024-06-22 ASSESSMENT — PAIN DESCRIPTION - ORIENTATION
ORIENTATION_3: RIGHT
ORIENTATION: LEFT
ORIENTATION_2: LOWER
ORIENTATION: LEFT;MID
ORIENTATION: LOWER

## 2024-06-22 ASSESSMENT — PAIN DESCRIPTION - INTENSITY
RATING_3: 6
RATING_2: 5

## 2024-06-22 ASSESSMENT — PAIN SCALES - GENERAL
PAINLEVEL_OUTOF10: 1
PAINLEVEL_OUTOF10: 5
PAINLEVEL_OUTOF10: 1
PAINLEVEL_OUTOF10: 1

## 2024-06-22 ASSESSMENT — PAIN DESCRIPTION - PAIN TYPE: TYPE: ACUTE PAIN

## 2024-06-22 ASSESSMENT — PAIN DESCRIPTION - LOCATION
LOCATION_3: LEG
LOCATION_2: BACK
LOCATION: NECK
LOCATION: ARM;JAW
LOCATION: ARM

## 2024-06-22 ASSESSMENT — PAIN - FUNCTIONAL ASSESSMENT
PAIN_FUNCTIONAL_ASSESSMENT_SITE2: PREVENTS OR INTERFERES SOME ACTIVE ACTIVITIES AND ADLS
PAIN_FUNCTIONAL_ASSESSMENT: 0-10
PAIN_FUNCTIONAL_ASSESSMENT: PREVENTS OR INTERFERES SOME ACTIVE ACTIVITIES AND ADLS

## 2024-06-22 ASSESSMENT — PAIN DESCRIPTION - ONSET: ONSET: ON-GOING

## 2024-06-22 ASSESSMENT — PAIN DESCRIPTION - FREQUENCY: FREQUENCY: CONTINUOUS

## 2024-06-22 NOTE — CONSULTS
ORTHOPEDIC SURGERY CONSULT    Subjective:     Date of Consultation:  June 22, 2024    Erin Galindo is a 73 y.o. female who is being seen for left hand injury. She reports multiple falls recently and fell again yesterday. She was admitted in April for pubic rami fracture after a fall. She followed up in the office with Dr. Davenport last month. She has been using a walker to provide stability when she walks. She does not know what is causing her to fall. She is being admitted to medicine today for further work-up. Xrays of the left hand today show an acute fracture of the base of the 5th metacarpal. She was placed in an ulnar gutter splint by the ER. Orthopedic surgery has been consulted for recommendations regarding management of her acute left hand injury.    Patient Active Problem List    Diagnosis Date Noted    Altered mental status 06/22/2024    Pubic ramus fracture, right, closed, initial encounter (HCC) 04/21/2024    Psoriasis 05/03/2017    Dry eyes 03/20/2017    Insomnia 04/18/2016    Urinary frequency 02/02/2015    Glaucoma 12/10/2014    Diabetes mellitus type II, uncontrolled 08/05/2014    Heart murmur previously undiagnosed 04/24/2013    Carotid bruit 03/11/2011    HX: breast cancer 03/11/2011    Vitamin D deficiency 04/07/2010    Pure hypercholesterolemia 09/11/2009    Essential hypertension 09/11/2009    Mood changes 09/11/2009     Family History   Problem Relation Age of Onset    Diabetes Brother     Cancer Brother     Heart Disease Father     Hypertension Father     Diabetes Father     Hypertension Mother     No Known Problems Brother       Social History     Tobacco Use    Smoking status: Never    Smokeless tobacco: Never   Substance Use Topics    Alcohol use: No     Past Medical History:   Diagnosis Date    Abdominal discomfort 10/15/14    notes  GI at [facility] 1/21/15note    Acute maxillary sinusitis 01/29/2017    [place] [facility] note  and then to ER [facility] 2/10/17    Advance directive discussed

## 2024-06-22 NOTE — ED TRIAGE NOTES
Patient arrives to ER via EMS from home for AMS and multiple falls. EMS reports patient has had multiple falls over the last few days, and that neighbors had to pick her up off sidewalk. Left hand appears bruised and swollen. Says her right leg, lower back and neck hurt. .
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
## 2024-06-22 NOTE — ED NOTES
Patient placed on bed-alarm.  
Ulnar gutter splint applied to LUE. Cap refill < 3 seconds. Pt endorses full sensation in fingers.   
     If any further questions, please call Sending RN at [phone]  Electronically signed by: Electronically signed by Greta Crowe RN on 6/22/2024 at 3:19 PM

## 2024-06-22 NOTE — H&P
REMOVAL  11/22/2016    right eye-done in [place] report rec'd    CATARACT REMOVAL  11/2016    bilateral    CHOLECYSTECTOMY      and ventral hernia and gastric bypass    GYN      BTL and a D and C    OTHER SURGICAL HISTORY      tummy tuck    OTHER SURGICAL HISTORY  03/09/2017    \"NAFLD\" per liver bx report    OTHER SURGICAL HISTORY  2005    gastric bypass dr ladd    UPPER GASTROINTESTINAL ENDOSCOPY  7/22/11    dr remi baker normal results     Prior to Admission medications    Medication Sig Start Date End Date Taking? Authorizing Provider   insulin glargine (LANTUS) 100 UNIT/ML injection vial Inject 8 Units into the skin nightly 4/25/24   Miriam Stauffer APRN - NP   insulin lispro (HUMALOG) 100 UNIT/ML SOLN injection vial Inject 3 Units into the skin 3 times daily (with meals) 4/25/24   Miriam Stauffer APRN - NP   insulin lispro (HUMALOG) 100 UNIT/ML SOLN injection vial Inject 0-4 Units into the skin 3 times daily (with meals) 4/25/24   Miriam Stauffer APRN - NP   sennosides-docusate sodium (SENOKOT-S) 8.6-50 MG tablet Take 1 tablet by mouth 2 times daily 4/25/24   Miriam Stauffer APRN - NP   metoprolol tartrate (LOPRESSOR) 25 MG tablet Take 1 tablet by mouth 2 times daily    Provider, Arden, MD MARGE MAGDALENO PO Take by mouth daily    Automatic Reconciliation, Ar   buPROPion (WELLBUTRIN XL) 150 MG extended release tablet Take 2 tablets by mouth daily 11/21/16   Automatic Reconciliation, Ar   escitalopram (LEXAPRO) 20 MG tablet Take 0.5 tablets by mouth daily 9/20/17   Automatic Reconciliation, Ar   halobetasol (ULTRAVATE) 0.05 % cream Apply topically 2 times daily 8/2/17   Automatic Reconciliation, Ar     Allergies   Allergen Reactions    Cefdinir Other (See Comments)     Arthralgias and serum sickness    Canagliflozin Other (See Comments)     Caused her to have frequent UTI's    Ciprofloxacin Other (See Comments)     arthralgias    Liraglutide Swelling     joints    Naproxen Other (See Comments)

## 2024-06-23 LAB
25(OH)D3 SERPL-MCNC: 22.3 NG/ML (ref 30–100)
ANION GAP SERPL CALC-SCNC: 1 MMOL/L (ref 5–15)
BUN SERPL-MCNC: 32 MG/DL (ref 6–20)
BUN/CREAT SERPL: 23 (ref 12–20)
CALCIUM SERPL-MCNC: 8.2 MG/DL (ref 8.5–10.1)
CHLORIDE SERPL-SCNC: 119 MMOL/L (ref 97–108)
CO2 SERPL-SCNC: 23 MMOL/L (ref 21–32)
CREAT SERPL-MCNC: 1.41 MG/DL (ref 0.55–1.02)
FOLATE SERPL-MCNC: 86.1 NG/ML (ref 5–21)
GLUCOSE SERPL-MCNC: 86 MG/DL (ref 65–100)
POTASSIUM SERPL-SCNC: 4.6 MMOL/L (ref 3.5–5.1)
SODIUM SERPL-SCNC: 143 MMOL/L (ref 136–145)
T4 FREE SERPL-MCNC: 1 NG/DL (ref 0.8–1.5)
TSH SERPL DL<=0.05 MIU/L-ACNC: 0.73 UIU/ML (ref 0.36–3.74)
VIT B12 SERPL-MCNC: 594 PG/ML (ref 193–986)

## 2024-06-23 PROCEDURE — 82607 VITAMIN B-12: CPT

## 2024-06-23 PROCEDURE — 97165 OT EVAL LOW COMPLEX 30 MIN: CPT

## 2024-06-23 PROCEDURE — 97116 GAIT TRAINING THERAPY: CPT

## 2024-06-23 PROCEDURE — 6360000002 HC RX W HCPCS: Performed by: NURSE PRACTITIONER

## 2024-06-23 PROCEDURE — 84439 ASSAY OF FREE THYROXINE: CPT

## 2024-06-23 PROCEDURE — 82306 VITAMIN D 25 HYDROXY: CPT

## 2024-06-23 PROCEDURE — 1100000000 HC RM PRIVATE

## 2024-06-23 PROCEDURE — 84443 ASSAY THYROID STIM HORMONE: CPT

## 2024-06-23 PROCEDURE — 82746 ASSAY OF FOLIC ACID SERUM: CPT

## 2024-06-23 PROCEDURE — 97535 SELF CARE MNGMENT TRAINING: CPT

## 2024-06-23 PROCEDURE — 97161 PT EVAL LOW COMPLEX 20 MIN: CPT

## 2024-06-23 PROCEDURE — 2580000003 HC RX 258: Performed by: NURSE PRACTITIONER

## 2024-06-23 PROCEDURE — 6370000000 HC RX 637 (ALT 250 FOR IP): Performed by: NURSE PRACTITIONER

## 2024-06-23 PROCEDURE — 36415 COLL VENOUS BLD VENIPUNCTURE: CPT

## 2024-06-23 PROCEDURE — 80048 BASIC METABOLIC PNL TOTAL CA: CPT

## 2024-06-23 RX ORDER — HYDRALAZINE HYDROCHLORIDE 20 MG/ML
10 INJECTION INTRAMUSCULAR; INTRAVENOUS EVERY 6 HOURS PRN
Status: DISCONTINUED | OUTPATIENT
Start: 2024-06-23 | End: 2024-06-24 | Stop reason: HOSPADM

## 2024-06-23 RX ADMIN — HEPARIN SODIUM 5000 UNITS: 5000 INJECTION INTRAVENOUS; SUBCUTANEOUS at 07:20

## 2024-06-23 RX ADMIN — METOPROLOL TARTRATE 25 MG: 25 TABLET, FILM COATED ORAL at 08:38

## 2024-06-23 RX ADMIN — HYDRALAZINE HYDROCHLORIDE 10 MG: 20 INJECTION, SOLUTION INTRAMUSCULAR; INTRAVENOUS at 02:45

## 2024-06-23 RX ADMIN — SODIUM CHLORIDE, PRESERVATIVE FREE 10 ML: 5 INJECTION INTRAVENOUS at 08:38

## 2024-06-23 RX ADMIN — HEPARIN SODIUM 5000 UNITS: 5000 INJECTION INTRAVENOUS; SUBCUTANEOUS at 20:12

## 2024-06-23 RX ADMIN — HEPARIN SODIUM 5000 UNITS: 5000 INJECTION INTRAVENOUS; SUBCUTANEOUS at 13:27

## 2024-06-23 RX ADMIN — ATORVASTATIN CALCIUM 10 MG: 10 TABLET, FILM COATED ORAL at 08:38

## 2024-06-23 RX ADMIN — ACETAMINOPHEN 1000 MG: 500 TABLET ORAL at 13:27

## 2024-06-23 RX ADMIN — ESCITALOPRAM OXALATE 10 MG: 10 TABLET ORAL at 08:38

## 2024-06-23 RX ADMIN — SODIUM CHLORIDE, PRESERVATIVE FREE 10 ML: 5 INJECTION INTRAVENOUS at 20:17

## 2024-06-23 RX ADMIN — ACETAMINOPHEN 1000 MG: 500 TABLET ORAL at 21:49

## 2024-06-23 RX ADMIN — BUPROPION HYDROCHLORIDE 300 MG: 300 TABLET, EXTENDED RELEASE ORAL at 08:38

## 2024-06-23 RX ADMIN — ACETAMINOPHEN 1000 MG: 500 TABLET ORAL at 07:21

## 2024-06-23 RX ADMIN — METOPROLOL TARTRATE 25 MG: 25 TABLET, FILM COATED ORAL at 20:12

## 2024-06-23 ASSESSMENT — PAIN DESCRIPTION - LOCATION: LOCATION: ARM

## 2024-06-23 ASSESSMENT — PAIN SCALES - GENERAL
PAINLEVEL_OUTOF10: 0
PAINLEVEL_OUTOF10: 1
PAINLEVEL_OUTOF10: 5
PAINLEVEL_OUTOF10: 4

## 2024-06-23 ASSESSMENT — PAIN DESCRIPTION - ORIENTATION: ORIENTATION: LEFT

## 2024-06-23 ASSESSMENT — PAIN DESCRIPTION - DESCRIPTORS: DESCRIPTORS: ACHING;PRESSURE

## 2024-06-23 NOTE — PROGRESS NOTES
Orders received, chart reviewed and patient evaluated by occupational therapy. Pending progression with skilled acute occupational therapy, recommend:  Continue to assess pending progress - SNF vs. IPR     Recommend with nursing patient to complete as able in order to maintain strength, endurance and independence: OOB to chair 3x/day, ADLs with supervision/setup and mobilizing to the bathroom for toileting with 1 assist using platform walker. Thank you for your assistance.     Full evaluation to follow.

## 2024-06-23 NOTE — PROGRESS NOTES
[facility] Adult  Hospitalist Group                                                                                          Hospitalist Progress Note  David M Milligram, PA-C  Answering service: [phone] OR [phone] from in house phone        Date of Service:  2024  NAME:  Erin Galindo  :  1950  MRN:  [id number]      Admission Summary:   Erin Galindo is a 73 y.o. female who presented to the ED with chief complaint of hand pain. Per EMS, her neighbors report that they have found her outside on the ground after falls several times over the last several days and are usually able to her back up and back into the house. In ED, pt was complaining of hand pain and EMS reported she complained of back pain as well. Work up in ED:  BUN 42 and Cr 2.06 (cr 1.45 on ). UA negative. CT scan of head and cervical spine negative for acute process. CT pelvis showed a subacute pubic rami fracture (not new). Chest x-ray showed mild edema. X-ray of left hand showed acute fracture base of fifth metacarpal bone and age indeterminant fracture of fourth metacarpal neck. Pt was placed in a ulnar gutter splint. For continued confusion and concern about her safety, she was referred to hospitalist service for admission.     Patient was seen and examined in the ED, she was lying in bed no acute distress, cooperative fairly interactive with assessment.  She was oriented to herself only, not oriented to the year, place or situation.  She had difficulty staying on topic of conversation but was able to follow commands.  Denied any headaches or dizziness, no chest pain or pressure, no shortness of breath.  Denied any GI complaints such as nausea, vomiting, diarrhea or constipation.  She ate 100% of the meal that was provided in the ED.  Denies any dysuria.  She reports some mild left hand pain.  Her rollator was present at bedside.  Orthopedics has seen patient, they recommend continuing with the

## 2024-06-24 VITALS
HEART RATE: 67 BPM | BODY MASS INDEX: 29.95 KG/M2 | DIASTOLIC BLOOD PRESSURE: 71 MMHG | WEIGHT: 152.56 LBS | HEIGHT: 60 IN | RESPIRATION RATE: 12 BRPM | SYSTOLIC BLOOD PRESSURE: 176 MMHG | OXYGEN SATURATION: 99 % | TEMPERATURE: 98.2 F

## 2024-06-24 PROBLEM — R41.82 ALTERED MENTAL STATUS: Status: RESOLVED | Noted: 2024-06-22 | Resolved: 2024-06-24

## 2024-06-24 PROCEDURE — 2580000003 HC RX 258: Performed by: NURSE PRACTITIONER

## 2024-06-24 PROCEDURE — 6360000002 HC RX W HCPCS: Performed by: NURSE PRACTITIONER

## 2024-06-24 PROCEDURE — 97535 SELF CARE MNGMENT TRAINING: CPT

## 2024-06-24 PROCEDURE — 97116 GAIT TRAINING THERAPY: CPT

## 2024-06-24 PROCEDURE — 6370000000 HC RX 637 (ALT 250 FOR IP): Performed by: NURSE PRACTITIONER

## 2024-06-24 RX ADMIN — ACETAMINOPHEN 1000 MG: 500 TABLET ORAL at 07:09

## 2024-06-24 RX ADMIN — BUPROPION HYDROCHLORIDE 300 MG: 300 TABLET, EXTENDED RELEASE ORAL at 08:28

## 2024-06-24 RX ADMIN — ATORVASTATIN CALCIUM 10 MG: 10 TABLET, FILM COATED ORAL at 08:28

## 2024-06-24 RX ADMIN — ACETAMINOPHEN 1000 MG: 500 TABLET ORAL at 14:27

## 2024-06-24 RX ADMIN — HEPARIN SODIUM 5000 UNITS: 5000 INJECTION INTRAVENOUS; SUBCUTANEOUS at 14:27

## 2024-06-24 RX ADMIN — HEPARIN SODIUM 5000 UNITS: 5000 INJECTION INTRAVENOUS; SUBCUTANEOUS at 07:09

## 2024-06-24 RX ADMIN — SODIUM CHLORIDE, PRESERVATIVE FREE 10 ML: 5 INJECTION INTRAVENOUS at 08:32

## 2024-06-24 RX ADMIN — METOPROLOL TARTRATE 25 MG: 25 TABLET, FILM COATED ORAL at 08:28

## 2024-06-24 RX ADMIN — ESCITALOPRAM OXALATE 10 MG: 10 TABLET ORAL at 08:28

## 2024-06-24 ASSESSMENT — PAIN SCALES - GENERAL
PAINLEVEL_OUTOF10: 5
PAINLEVEL_OUTOF10: 2

## 2024-06-24 NOTE — PROGRESS NOTES
Spiritual Care Assessment/Progress Note  [facility]    Name: Erin Galindo MRN: [id number]    Age: 73 y.o.     Sex: female   Language: English     Date: 6/24/2024            Total Time Calculated: 15 min              Spiritual Assessment begun in [facility] 5S ORTHO JOINT  Service Provided For: Patient  Referral/Consult From: Clergy/  Encounter Overview/Reason: Rituals, Rites and Sacraments    Spiritual beliefs:      [x] Involved in a phoebe tradition/spiritual practice: Cathollc     [x] Supported by a phoebe community: Can't recall name of Moravian     [] Claims no spiritual orientation:      [] Seeking spiritual identity:           [] Adheres to an individual form of spirituality:      [] Not able to assess:                Identified resources for coping and support system:   Support System: Children, Islam/phoebe community       [x] Prayer                  [] Devotional reading               [x] Music                  [] Guided Imagery     [x] Pet visits                                        [] Other: (COMMENT)     Specific area/focus of visit   Encounter:    Crisis:    Spiritual/Emotional needs: Type: Spiritual Support  Ritual, Rites and Sacraments: Type: Latter-day Communion  Grief, Loss, and Adjustments:    Ethics/Mediation:    Behavioral Health:    Palliative Care:    Advance Care Planning:      Plan/Referrals: Continue to visit, (comment)    Narrative: Mrs. Galindo was sitting up in her chair.  She was animated and delighted to be able to receive prayer and communion.  After prayer she began to share about her spirituality and personal connection to Eren.  She shared a depth of her spirituality and this  listened and gave witness to her and what supports her spiritually.  Assured of continued prayer for her well-being.  She was unable to recall the name of the parish she attends and said she was having trouble at times recalling things after her fall.     Sr. Nicole

## 2024-06-24 NOTE — CARE COORDINATION
RACQUEL:   Pt wishes to discharge home where she lives alone in an apartment with her cat. CM confirmed face sheet with Pt and noted that Pt is unclear about the location of her keys, where she receives PCP services, her daughter's phone number and her home address. She is unsure how/if her cat is being checked on and she is anxious to go home. Bradley lives locally but we do not have accurate phone number for her. Pt reports that her address book with all numbers is at her apartment and that none of her support system (bradley, friend network) knows where she is. She does not have a cell phone. She states that she used to have a personal emergency response button but does not know its location. She reports that \"once a year\" she falls and hits her head and can't recall what has happened until a few days later.     She has worked with [facility] before and when offered resources about personal care states that her friends provide the same/similar support. She reports that she is a . She has been trying to call her apartment complex to access phone numbers of her support network as she states that they have some of the numbers on file as part of her apartment enrollment/tenant paperwork. Lives at [address] [phone]    When asked about potential rehab, Pt is adamant that she needs to go home. CM contacted [facility] via [employer] to try to learn more and explore resources.     PCP: Pt states that she has not seen Dr. Maddox for a long time but doesn't really see any PCP; she states that she goes to hospital walk-in clinics as necessary but can't recall which hospital she goes to.     Pt's face sheet reads that her apartment is [address] but Pt states that her address is [address]     Care Management Initial Assessment       RUR: 17%  Readmission? No  1st IM letter given? Yes - 6/23 06/24/24 0885   Service Assessment   Patient Orientation Alert and Oriented   Cognition Alert   History Provided By

## 2024-06-24 NOTE — DISCHARGE SUMMARY
Fx  - X-ray of left hand showed acute fracture base of fifth metacarpal bone and age indeterminant fracture of fourth metacarpal neck.   - in ulnar gutter splint  - Orthopedics has seen, continue with current splint  - Patient should be nonweightbearing and recommended would likely benefit from a platform walker  - PT/OT ordered  - avoid opioids - schedule tylenol  - will need to follo wup with hand surgery outpatient     T2DM with retinopathy  - A1c 5.9  - follows with Endo Dr Segura outpatient  -Home meds      CKD Stage 3  - At baseline today: 1.41  - follows with Renal Dr John Land  - Follow BMP, renally dose meds     HTN  HLD   - resume home BB  - resume statin     H/o R breast CA s/p lumpectomy/chemo/XRT (Onc Dr Sun)  Depression  SALEH  Hearing deficit  Insomnia  Pancreatic pseudocyst  Obesity s/p gastric bypass  R vision loss s/p childhood trauma        PENDING TEST RESULTS:   At the time of discharge the following test results are still pending: none    FOLLOW UP APPOINTMENTS:    [unfilled]     ADDITIONAL CARE RECOMMENDATIONS: none    DIET: regular diet    ACTIVITY: activity as tolerated    WOUND CARE: none    EQUIPMENT needed: none      DISCHARGE MEDICATIONS:     Medication List        CONTINUE taking these medications      ALOE VERA PO     atorvastatin 10 MG tablet  Commonly known as: LIPITOR     buPROPion 150 MG extended release tablet  Commonly known as: WELLBUTRIN XL     escitalopram 20 MG tablet  Commonly known as: LEXAPRO     halobetasol 0.05 % cream  Commonly known as: ULTRAVATE     insulin glargine 100 UNIT/ML injection vial  Commonly known as: LANTUS  Inject 8 Units into the skin nightly     * insulin lispro 100 UNIT/ML Soln injection vial  Commonly known as: HUMALOG,ADMELOG  Inject 3 Units into the skin 3 times daily (with meals)     * insulin lispro 100 UNIT/ML Soln injection vial  Commonly known as: HUMALOG,ADMELOG  Inject 0-4 Units into the skin 3 times daily (with meals)     losartan 50 MG

## 2024-06-24 NOTE — CARE COORDINATION
RACQUEL:   [facility] for SN, PT, and OT - accepted    [address], stretcher transport as Pt is NWB in right arm and cannot access apartment using walker given steps to enter.     CM spoke with  Fany at apt [phone] ext [phone] who will unlock the apartment, notify neighbor of Pt's return (neighbor provides support and is home and accessible 24/7). Fany states that she will check on Pt tomorrow.     Walker with left arm platform to arrive between 3 & 4 pm via [address]; per Therapy, Pt cannot enter her home without the platform; CM in communication with [employer] about realistic timeline for getting walker with platform.     IM letter given yesterday.    Pt wishes to discharge home where she lives alone in an apartment with her cat. CM confirmed face sheet with Pt and noted that Pt is unclear about the location of her keys, where she receives PCP services, her daughter's phone number and her home address. She is unsure how/if her cat is being checked on and she is anxious to go home. Bradley lives locally but we do not have accurate phone number for her. Pt reports that her address book with all numbers is at her apartment and that none of her support system (bradley, friend network) knows where she is. She does not have her phone with her She states that she used to have a personal emergency response button but does not know its location. She reports that \"once a year\" she falls and hits her head and can't recall what has happened until a few days later.     She has worked with [facility] before and when offered resources about personal care states that her friends provide the same/similar support. She reports that she is a . She has been trying to call her apartment complex to access phone numbers of her support network as she states that they have some of the numbers on file as part of her apartment enrollment/tenant paperwork. Lives at [address] [phone]. CM called and left

## 2024-06-24 NOTE — PLAN OF CARE
Problem: Occupational Therapy - Adult  Goal: By Discharge: Performs self-care activities at highest level of function for planned discharge setting.  See evaluation for individualized goals.  Description: FUNCTIONAL STATUS PRIOR TO ADMISSION:  Patient live alone in 1 level apartment and reports being IND with ADLs and mod I with mobility using rollator. Patient has friends who assist with IADLs as needed.     Occupational Therapy Goals:  Initiated 6/23/2024  1.  Patient will perform grooming independently within 7 day(s).  2.  Patient will perform bathing Modified Independently within 7 day(s).  3.  Patient will perform lower body dressing independently within 7 day(s).  4.  Patient will perform toilet transfers independently within 7 day(s).  5.  Patient will perform all aspects of toileting independently within 7 day(s).  6.  Patient will participate in upper extremity therapeutic exercise/activities with Supervision for 5 minutes within 7 day(s).    7.  Patient will utilize energy conservation techniques during functional activities with verbal cues within 7 day(s).  Outcome: Progressing  OCCUPATIONAL THERAPY TREATMENT  Patient: Erin Galindo (73 y.o. female)  Date: 6/24/2024  Primary Diagnosis: Hyperkalemia [E87.5]  Altered mental status [R41.82]  Acute kidney injury (HCC) [N17.9]  Closed head injury, initial encounter [S09.90XA]  Altered mental status, unspecified altered mental status type [R41.82]  Closed fracture of phalanx of digit of hand, initial encounter [S62.609A]       Precautions: Fall Risk, Weight Bearing   Left Lower Extremity Weight Bearing: Non Weight Bearing   Left Upper Extremity Weight Bearing: Non Weight Bearing        Chart, occupational therapy assessment, plan of care, and goals were reviewed.    ASSESSMENT  Pt rec'd standing up from chair, requesting use of bathroom and agreeable to OT tx. Pt is overall SBA for functional mobility using L platform walker, but requires frequent cues 
  Problem: Physical Therapy - Adult  Goal: By Discharge: Performs mobility at highest level of function for planned discharge setting.  See evaluation for individualized goals.  Description: FUNCTIONAL STATUS PRIOR TO ADMISSION: Patient was modified independent using a rollator for functional mobility.    HOME SUPPORT PRIOR TO ADMISSION: The patient lived alone with friends to provide assistance.    Physical Therapy Goals  Initiated 6/23/2024  1.  Patient will move from supine to sit and sit to supine in bed with independence within 7 day(s).    2.  Patient will perform sit to stand with independence within 7 day(s).  3.  Patient will transfer from bed to chair and chair to bed with independence using the least restrictive device within 7 day(s).  4.  Patient will ambulate with modified independence for 300 feet with the L platform rolling walker within 7 day(s).   5.  Patient will ascend/descend 3 stairs with 1 handrail(s) with modified independence within 7 day(s).   Outcome: Progressing     PHYSICAL THERAPY EVALUATION    Patient: Erin Galindo (73 y.o. female)  Date: 6/23/2024  Primary Diagnosis: Hyperkalemia [E87.5]  Altered mental status [R41.82]  Acute kidney injury (HCC) [N17.9]  Closed head injury, initial encounter [S09.90XA]  Altered mental status, unspecified altered mental status type [R41.82]  Closed fracture of phalanx of digit of hand, initial encounter [S62.609A]       Precautions: Restrictions/Precautions: Fall Risk    Left Upper Extremity Weight Bearing: Non Weight Bearing              ASSESSMENT :   DEFICITS/IMPAIRMENTS: Patient cleared for physical therapy per nursing and agreeable to PT. Patient admitted to [facility] following a fall at home. She suffered fractures in the 5th metacarpal of the L hand without surgical repair. Patient was placed in a ulnar gutter splint and has weight bearing limitations. Patient is currently at a contact guard level for bed mobility, transfers, and ambulation with 
  Problem: Safety - Adult  Goal: Free from fall injury  6/22/2024 2217 by Regina Clark RN  Outcome: Progressing  6/22/2024 1843 by Paradise Landaverde RN  Outcome: Progressing     Problem: Discharge Planning  Goal: Discharge to home or other facility with appropriate resources  Outcome: Progressing  Flowsheets (Taken 6/22/2024 1656 by Paradise Landaverde, RN)  Discharge to home or other facility with appropriate resources: Identify discharge learning needs (meds, wound care, etc)     Problem: Pain  Goal: Verbalizes/displays adequate comfort level or baseline comfort level  6/22/2024 2217 by Regina Clark RN  Outcome: Progressing  6/22/2024 1843 by Paradise Landaverde RN  Outcome: Progressing     Problem: Skin/Tissue Integrity  Goal: Absence of new skin breakdown  Description: 1.  Monitor for areas of redness and/or skin breakdown  2.  Assess vascular access sites hourly  3.  Every 4-6 hours minimum:  Change oxygen saturation probe site  4.  Every 4-6 hours:  If on nasal continuous positive airway pressure, respiratory therapy assess nares and determine need for appliance change or resting period.  6/22/2024 2217 by Regina Clark, RN  Outcome: Progressing  6/22/2024 1843 by Paradise Landaverde RN  Outcome: Progressing     
  Problem: Safety - Adult  Goal: Free from fall injury  6/23/2024 2327 by Mervin Agustin RN  Outcome: Progressing  6/23/2024 1344 by Jennifer Hale RN  Outcome: Progressing     Problem: Discharge Planning  Goal: Discharge to home or other facility with appropriate resources  Outcome: Progressing  Flowsheets (Taken 6/23/2024 2100)  Discharge to home or other facility with appropriate resources:   Identify barriers to discharge with patient and caregiver   Arrange for needed discharge resources and transportation as appropriate     Problem: Pain  Goal: Verbalizes/displays adequate comfort level or baseline comfort level  6/23/2024 2327 by Mervin Agustin RN  Outcome: Progressing  6/23/2024 1344 by Jennifer Hale RN  Outcome: Progressing     Problem: Skin/Tissue Integrity  Goal: Absence of new skin breakdown  Description: 1.  Monitor for areas of redness and/or skin breakdown  2.  Assess vascular access sites hourly  3.  Every 4-6 hours minimum:  Change oxygen saturation probe site  4.  Every 4-6 hours:  If on nasal continuous positive airway pressure, respiratory therapy assess nares and determine need for appliance change or resting period.  6/23/2024 2327 by Mervin Agustin RN  Outcome: Progressing  6/23/2024 1344 by Jennifer Hale RN  Outcome: Progressing     Problem: Physical Therapy - Adult  Goal: By Discharge: Performs mobility at highest level of function for planned discharge setting.  See evaluation for individualized goals.  Description: FUNCTIONAL STATUS PRIOR TO ADMISSION: Patient was modified independent using a rollator for functional mobility.    HOME SUPPORT PRIOR TO ADMISSION: The patient lived alone with friends to provide assistance.    Physical Therapy Goals  Initiated 6/23/2024  1.  Patient will move from supine to sit and sit to supine in bed with independence within 7 day(s).    2.  Patient will perform sit to stand with independence within 7 day(s).  3.  Patient will transfer from bed to chair 
  Problem: Safety - Adult  Goal: Free from fall injury  Outcome: Adequate for Discharge     Problem: Discharge Planning  Goal: Discharge to home or other facility with appropriate resources  Outcome: Adequate for Discharge     Problem: Pain  Goal: Verbalizes/displays adequate comfort level or baseline comfort level  Outcome: Adequate for Discharge     Problem: Skin/Tissue Integrity  Goal: Absence of new skin breakdown  Description: 1.  Monitor for areas of redness and/or skin breakdown  2.  Assess vascular access sites hourly  3.  Every 4-6 hours minimum:  Change oxygen saturation probe site  4.  Every 4-6 hours:  If on nasal continuous positive airway pressure, respiratory therapy assess nares and determine need for appliance change or resting period.  Outcome: Adequate for Discharge     Problem: Chronic Conditions and Co-morbidities  Goal: Patient's chronic conditions and co-morbidity symptoms are monitored and maintained or improved  Outcome: Adequate for Discharge     
  Problem: Safety - Adult  Goal: Free from fall injury  Outcome: Progressing     Problem: Pain  Goal: Verbalizes/displays adequate comfort level or baseline comfort level  Outcome: Progressing     Problem: Skin/Tissue Integrity  Goal: Absence of new skin breakdown  Description: 1.  Monitor for areas of redness and/or skin breakdown  2.  Assess vascular access sites hourly  3.  Every 4-6 hours minimum:  Change oxygen saturation probe site  4.  Every 4-6 hours:  If on nasal continuous positive airway pressure, respiratory therapy assess nares and determine need for appliance change or resting period.  Outcome: Progressing     
  Problem: Safety - Adult  Goal: Free from fall injury  Outcome: Progressing     Problem: Pain  Goal: Verbalizes/displays adequate comfort level or baseline comfort level  Outcome: Progressing     Problem: Skin/Tissue Integrity  Goal: Absence of new skin breakdown  Description: 1.  Monitor for areas of redness and/or skin breakdown  2.  Assess vascular access sites hourly  3.  Every 4-6 hours minimum:  Change oxygen saturation probe site  4.  Every 4-6 hours:  If on nasal continuous positive airway pressure, respiratory therapy assess nares and determine need for appliance change or resting period.  Outcome: Progressing     Problem: Physical Therapy - Adult  Goal: By Discharge: Performs mobility at highest level of function for planned discharge setting.  See evaluation for individualized goals.  Description: FUNCTIONAL STATUS PRIOR TO ADMISSION: Patient was modified independent using a rollator for functional mobility.    HOME SUPPORT PRIOR TO ADMISSION: The patient lived alone with friends to provide assistance.    Physical Therapy Goals  Initiated 6/23/2024  1.  Patient will move from supine to sit and sit to supine in bed with independence within 7 day(s).    2.  Patient will perform sit to stand with independence within 7 day(s).  3.  Patient will transfer from bed to chair and chair to bed with independence using the least restrictive device within 7 day(s).  4.  Patient will ambulate with modified independence for 300 feet with the L platform rolling walker within 7 day(s).   5.  Patient will ascend/descend 3 stairs with 1 handrail(s) with modified independence within 7 day(s).   6/23/2024 1023 by Shayna Frey, PT  Outcome: Progressing     Problem: Occupational Therapy - Adult  Goal: By Discharge: Performs self-care activities at highest level of function for planned discharge setting.  See evaluation for individualized goals.  Description: FUNCTIONAL STATUS PRIOR TO ADMISSION:  Patient live alone in 1 
instructions - regarding use of RW at all times and weight bearing status left hand.  Recommend SNF - however, pt adamant about discharging home.         PLAN:  Patient continues to benefit from skilled intervention to address the above impairments.  Continue treatment per established plan of care.        Recommendation for discharge: (in order for the patient to meet his/her long term goals): Therapy up to 5 days/week in Skilled nursing facility    Other factors to consider for discharge: lives alone, poor safety awareness, impaired cognition, high risk for falls, not safe to be alone, concern for safely navigating or managing the home environment, and new weight bearing restrictions limiting activity or patient is unable to maintain    IF patient discharges home will need the following DME:  RW with left platform attachment delivered and adjusted to proper height       SUBJECTIVE:   Patient stated, \"I need to get home to my vaughn.\"    OBJECTIVE DATA SUMMARY:   Critical Behavior:  Orientation  Overall Orientation Status: Impaired  Orientation Level: Oriented to person;Oriented to place;Disoriented to situation;Disoriented to time  Cognition  Overall Cognitive Status: Exceptions  Arousal/Alertness: Appears intact  Following Commands: Follows one step commands consistently  Attention Span: Difficulty dividing attention;Attends with cues to redirect  Memory: Decreased short term memory;Decreased recall of recent events;Decreased recall of precautions  Safety Judgement: Decreased awareness of need for assistance;Decreased awareness of need for safety  Problem Solving: Assistance required to identify errors made;Assistance required to generate solutions;Impaired;Decreased awareness of errors;Assistance required to correct errors made  Insights: Decreased awareness of deficits  Initiation: Appears intact  Sequencing: Requires cues for some    Functional Mobility Training:  Bed Mobility:  Bed Mobility Training  Bed 
help for balance, handling clothes or toilet paper  Chair/Bed Trannsfers: Minimum assistance or supervision required  Ambulation: With help for 50 yards  Stairs: Needs help or supervision  Total Barthel Index Score: 65       The Barthel ADL Index: Guidelines  1. The index should be used as a record of what a patient does, not as a record of what a patient could do.  2. The main aim is to establish degree of independence from any help, physical or verbal, however minor and for whatever reason.  3. The need for supervision renders the patient not independent.  4. A patient's performance should be established using the best available evidence. Asking the patient, friends/relatives and nurses are the usual sources, but direct observation and common sense are also important. However direct testing is not needed.  5. Usually the patient's performance over the preceding 24-48 hours is important, but occasionally longer periods will be relevant.  6. Middle categories imply that the patient supplies over 50 per cent of the effort.  7. Use of aids to be independent is allowed.    Score Interpretation (from Wallace 1997)    Independent   60-79 Minimally independent   40-59 Partially dependent   20-39 Very dependent   <20 Totally dependent     -Lynda Elizalde., Barthel, D.W. (1965). Functional evaluation: the Barthel Index. Md State Med J 142.  -LISA Gonsalez, ART Becerra (1997). The Barthel activities of daily living index: self-reporting versus actual performance in the old (> or = 75 years). Journal of American Geriatric Society 45(7), 832-836.   -David Merritt, [email or web address].F, PITER Sanchez., Wale, J.A., Hamm, A.J. (1999). Measuring the change in disability after inpatient rehabilitation; comparison of the responsiveness of the Barthel Index and Functional Motley Measure. Journal of Neurology, Neurosurgery, and Psychiatry, 66(4), 480-484.  -SIMA DegrootJ.MARY, DEAN Collado, & Wanda MMeaghanA. (2004)

## 2024-06-26 NOTE — PROGRESS NOTES
Physician Progress Note      PATIENT:               BRAYAN BRAUN  CSN #:                  [id number]  :                       1950  ADMIT DATE:       2024 8:54 AM  DISCH DATE:        2024 5:04 PM  RESPONDING  PROVIDER #:        Willie Todd MD          QUERY TEXT:    73yoM pt admitted with AMS & R hand pain. Documentation noted to have 'Acute   kidney injury (HCC) in ED.  Pt was given 1L NSS fluid bolus.   If possible,   please document in the progress notes and discharge summary if you are   evaluating and/or treating any of the following:    The medical record reflects the following:  Risk Factors:  CKD 3. ED noted ' recently admitted for report of multiple   falls and possible dementia with some renal insufficiency.'  Clinical Indicators:  noted for confusion, oriented x 1; per ED BUN is 42 and   creatinine of 2.06 last creatinine was 1.45 on .\"  Treatment: 1L NSS IVF bolus, maintenance IVF, monitor labs.    Defined by Kidney Disease Improving Global Outcomes (KDIGO) clinical practice   guideline for acute kidney injury:  -Increase in SCr by greater than or equal to 0.3 mg/dl within 48 hours; or  -Increase or decrease in SCr to greater than or equal to 1.5 times baseline,   which is known or presumed to have occurred within the prior 7 days; or  -Urine volume < 0.5ml/kg/h for 6 hours    Thank you,  Sheridan Thomson RN, CDI  Options provided:  -- Acute kidney failure POA  -- Acute kidney injury on CKD stage 3  -- Other - I will add my own diagnosis  -- Disagree - Not applicable / Not valid  -- Disagree - Clinically unable to determine / Unknown  -- Refer to Clinical Documentation Reviewer    PROVIDER RESPONSE TEXT:    This patient has an Acute kidney injury POA on CKD stage 3.    Query created by: Sheridan Thomson on 2024 11:18 AM      QUERY TEXT:    Pt admitted with AMS and L hand pain. Pt noted to have DIMA in ED, T2DM with   retinopathy, CKD stage 3, L hand fx. If

## 2024-06-27 LAB
BACTERIA SPEC CULT: NORMAL
BACTERIA SPEC CULT: NORMAL
SERVICE CMNT-IMP: NORMAL
SERVICE CMNT-IMP: NORMAL

## 2024-07-05 ENCOUNTER — ENROLLMENT (OUTPATIENT)
Dept: PHARMACY | Facility: CLINIC | Age: 74
End: 2024-07-05

## 2024-09-16 ENCOUNTER — TRANSCRIBE ORDERS (OUTPATIENT)
Facility: HOSPITAL | Age: 74
End: 2024-09-16

## 2024-09-16 DIAGNOSIS — I70.25 ATHEROSCLEROSIS OF NATIVE ARTERY OF LOWER EXTREMITY WITH ULCERATION OF FOOT (HCC): Primary | ICD-10-CM

## 2024-09-16 DIAGNOSIS — L97.509 ATHEROSCLEROSIS OF NATIVE ARTERY OF LOWER EXTREMITY WITH ULCERATION OF FOOT (HCC): Primary | ICD-10-CM

## 2024-09-19 ENCOUNTER — APPOINTMENT (OUTPATIENT)
Facility: HOSPITAL | Age: 74
DRG: 812 | End: 2024-09-19
Payer: MEDICARE

## 2024-09-19 ENCOUNTER — HOSPITAL ENCOUNTER (INPATIENT)
Facility: HOSPITAL | Age: 74
LOS: 4 days | Discharge: HOME HEALTH | DRG: 812 | End: 2024-09-23
Attending: EMERGENCY MEDICINE | Admitting: FAMILY MEDICINE
Payer: MEDICARE

## 2024-09-19 DIAGNOSIS — D64.9 ANEMIA, UNSPECIFIED TYPE: ICD-10-CM

## 2024-09-19 DIAGNOSIS — M54.50 ACUTE BILATERAL LOW BACK PAIN, UNSPECIFIED WHETHER SCIATICA PRESENT: ICD-10-CM

## 2024-09-19 DIAGNOSIS — R19.5 HEME POSITIVE STOOL: Primary | ICD-10-CM

## 2024-09-19 PROBLEM — K92.2 GI BLEED: Status: ACTIVE | Noted: 2024-09-19

## 2024-09-19 PROBLEM — R79.82 ELEVATED C-REACTIVE PROTEIN (CRP): Status: ACTIVE | Noted: 2024-09-19

## 2024-09-19 PROBLEM — R70.0 ELEVATED ERYTHROCYTE SEDIMENTATION RATE: Status: ACTIVE | Noted: 2024-09-19

## 2024-09-19 LAB
ALBUMIN SERPL-MCNC: 3 G/DL (ref 3.5–5)
ALBUMIN/GLOB SERPL: 1 (ref 1.1–2.2)
ALP SERPL-CCNC: 145 U/L (ref 45–117)
ALT SERPL-CCNC: 34 U/L (ref 12–78)
ANION GAP SERPL CALC-SCNC: 4 MMOL/L (ref 2–12)
APPEARANCE UR: CLEAR
AST SERPL-CCNC: 52 U/L (ref 15–37)
BACTERIA URNS QL MICRO: NEGATIVE /HPF
BILIRUB SERPL-MCNC: 0.4 MG/DL (ref 0.2–1)
BILIRUB UR QL: NEGATIVE
BUN SERPL-MCNC: 29 MG/DL (ref 6–20)
BUN/CREAT SERPL: 21 (ref 12–20)
CALCIUM SERPL-MCNC: 8.5 MG/DL (ref 8.5–10.1)
CHLORIDE SERPL-SCNC: 116 MMOL/L (ref 97–108)
CK SERPL-CCNC: 79 U/L (ref 26–192)
CO2 SERPL-SCNC: 24 MMOL/L (ref 21–32)
COLOR UR: ABNORMAL
COMMENT:: NORMAL
COMMENT:: NORMAL
CREAT SERPL-MCNC: 1.41 MG/DL (ref 0.55–1.02)
CRP SERPL-MCNC: 11.3 MG/DL (ref 0–0.3)
EKG ATRIAL RATE: 66 BPM
EKG DIAGNOSIS: NORMAL
EKG P AXIS: 15 DEGREES
EKG P-R INTERVAL: 140 MS
EKG Q-T INTERVAL: 394 MS
EKG QRS DURATION: 76 MS
EKG QTC CALCULATION (BAZETT): 413 MS
EKG R AXIS: 10 DEGREES
EKG T AXIS: 33 DEGREES
EKG VENTRICULAR RATE: 66 BPM
EPITH CASTS URNS QL MICRO: ABNORMAL /LPF
ERYTHROCYTE [DISTWIDTH] IN BLOOD BY AUTOMATED COUNT: 15.6 % (ref 11.5–14.5)
ERYTHROCYTE [SEDIMENTATION RATE] IN BLOOD: 73 MM/HR (ref 0–30)
EST. AVERAGE GLUCOSE BLD GHB EST-MCNC: 111 MG/DL
FLUAV RNA SPEC QL NAA+PROBE: NOT DETECTED
FLUBV RNA SPEC QL NAA+PROBE: NOT DETECTED
GLOBULIN SER CALC-MCNC: 3 G/DL (ref 2–4)
GLUCOSE BLD STRIP.AUTO-MCNC: 108 MG/DL (ref 65–117)
GLUCOSE SERPL-MCNC: 65 MG/DL (ref 65–100)
GLUCOSE UR STRIP.AUTO-MCNC: NEGATIVE MG/DL
HBA1C MFR BLD: 5.5 % (ref 4–5.6)
HCT VFR BLD AUTO: 24.2 % (ref 35–47)
HCT VFR BLD AUTO: 25.6 % (ref 35–47)
HEMOCCULT STL QL: POSITIVE
HGB BLD-MCNC: 7.1 G/DL (ref 11.5–16)
HGB BLD-MCNC: 7.4 G/DL (ref 11.5–16)
HGB UR QL STRIP: NEGATIVE
HISTORY CHECK: NORMAL
KETONES UR QL STRIP.AUTO: NEGATIVE MG/DL
LEUKOCYTE ESTERASE UR QL STRIP.AUTO: ABNORMAL
MCH RBC QN AUTO: 25.5 PG (ref 26–34)
MCHC RBC AUTO-ENTMCNC: 28.9 G/DL (ref 30–36.5)
MCV RBC AUTO: 88.3 FL (ref 80–99)
NITRITE UR QL STRIP.AUTO: NEGATIVE
NRBC # BLD: 0 K/UL (ref 0–0.01)
NRBC BLD-RTO: 0 PER 100 WBC
PH UR STRIP: 5.5 (ref 5–8)
PLATELET # BLD AUTO: 189 K/UL (ref 150–400)
PMV BLD AUTO: 9.3 FL (ref 8.9–12.9)
POTASSIUM SERPL-SCNC: 4.6 MMOL/L (ref 3.5–5.1)
PROT SERPL-MCNC: 6 G/DL (ref 6.4–8.2)
PROT UR STRIP-MCNC: 30 MG/DL
RBC # BLD AUTO: 2.9 M/UL (ref 3.8–5.2)
RBC #/AREA URNS HPF: ABNORMAL /HPF (ref 0–5)
SARS-COV-2 RNA RESP QL NAA+PROBE: NOT DETECTED
SERVICE CMNT-IMP: NORMAL
SODIUM SERPL-SCNC: 144 MMOL/L (ref 136–145)
SOURCE: NORMAL
SP GR UR REFRACTOMETRY: 1.02 (ref 1–1.03)
SPECIMEN HOLD: NORMAL
TROPONIN I SERPL HS-MCNC: 5 NG/L (ref 0–51)
UROBILINOGEN UR QL STRIP.AUTO: 0.2 EU/DL (ref 0.2–1)
WBC # BLD AUTO: 8.9 K/UL (ref 3.6–11)
WBC URNS QL MICRO: ABNORMAL /HPF (ref 0–4)

## 2024-09-19 PROCEDURE — 99285 EMERGENCY DEPT VISIT HI MDM: CPT

## 2024-09-19 PROCEDURE — 2580000003 HC RX 258: Performed by: FAMILY MEDICINE

## 2024-09-19 PROCEDURE — 82962 GLUCOSE BLOOD TEST: CPT

## 2024-09-19 PROCEDURE — 82272 OCCULT BLD FECES 1-3 TESTS: CPT

## 2024-09-19 PROCEDURE — 81001 URINALYSIS AUTO W/SCOPE: CPT

## 2024-09-19 PROCEDURE — 86900 BLOOD TYPING SEROLOGIC ABO: CPT

## 2024-09-19 PROCEDURE — 86140 C-REACTIVE PROTEIN: CPT

## 2024-09-19 PROCEDURE — 83036 HEMOGLOBIN GLYCOSYLATED A1C: CPT

## 2024-09-19 PROCEDURE — 85027 COMPLETE CBC AUTOMATED: CPT

## 2024-09-19 PROCEDURE — 82550 ASSAY OF CK (CPK): CPT

## 2024-09-19 PROCEDURE — 93005 ELECTROCARDIOGRAM TRACING: CPT | Performed by: EMERGENCY MEDICINE

## 2024-09-19 PROCEDURE — 84484 ASSAY OF TROPONIN QUANT: CPT

## 2024-09-19 PROCEDURE — 80053 COMPREHEN METABOLIC PANEL: CPT

## 2024-09-19 PROCEDURE — 85018 HEMOGLOBIN: CPT

## 2024-09-19 PROCEDURE — 85014 HEMATOCRIT: CPT

## 2024-09-19 PROCEDURE — 87636 SARSCOV2 & INF A&B AMP PRB: CPT

## 2024-09-19 PROCEDURE — 36415 COLL VENOUS BLD VENIPUNCTURE: CPT

## 2024-09-19 PROCEDURE — 85652 RBC SED RATE AUTOMATED: CPT

## 2024-09-19 PROCEDURE — 71045 X-RAY EXAM CHEST 1 VIEW: CPT

## 2024-09-19 PROCEDURE — 86901 BLOOD TYPING SEROLOGIC RH(D): CPT

## 2024-09-19 PROCEDURE — 74177 CT ABD & PELVIS W/CONTRAST: CPT

## 2024-09-19 PROCEDURE — 86923 COMPATIBILITY TEST ELECTRIC: CPT

## 2024-09-19 PROCEDURE — 6360000004 HC RX CONTRAST MEDICATION: Performed by: GENERAL PRACTICE

## 2024-09-19 PROCEDURE — 2060000000 HC ICU INTERMEDIATE R&B

## 2024-09-19 PROCEDURE — 6370000000 HC RX 637 (ALT 250 FOR IP): Performed by: FAMILY MEDICINE

## 2024-09-19 PROCEDURE — 86850 RBC ANTIBODY SCREEN: CPT

## 2024-09-19 RX ORDER — SODIUM CHLORIDE 0.9 % (FLUSH) 0.9 %
5-40 SYRINGE (ML) INJECTION PRN
Status: DISCONTINUED | OUTPATIENT
Start: 2024-09-19 | End: 2024-09-23 | Stop reason: HOSPADM

## 2024-09-19 RX ORDER — ESCITALOPRAM OXALATE 10 MG/1
10 TABLET ORAL DAILY
Status: DISCONTINUED | OUTPATIENT
Start: 2024-09-20 | End: 2024-09-23 | Stop reason: HOSPADM

## 2024-09-19 RX ORDER — PREDNISONE 5 MG/1
15 TABLET ORAL DAILY
Status: DISCONTINUED | OUTPATIENT
Start: 2024-09-19 | End: 2024-09-20

## 2024-09-19 RX ORDER — ACETAMINOPHEN 650 MG/1
650 SUPPOSITORY RECTAL EVERY 6 HOURS PRN
Status: DISCONTINUED | OUTPATIENT
Start: 2024-09-19 | End: 2024-09-23 | Stop reason: HOSPADM

## 2024-09-19 RX ORDER — LOSARTAN POTASSIUM 50 MG/1
50 TABLET ORAL DAILY
Status: DISCONTINUED | OUTPATIENT
Start: 2024-09-20 | End: 2024-09-23 | Stop reason: HOSPADM

## 2024-09-19 RX ORDER — IOPAMIDOL 755 MG/ML
100 INJECTION, SOLUTION INTRAVASCULAR
Status: COMPLETED | OUTPATIENT
Start: 2024-09-19 | End: 2024-09-19

## 2024-09-19 RX ORDER — SODIUM CHLORIDE 0.9 % (FLUSH) 0.9 %
5-40 SYRINGE (ML) INJECTION EVERY 12 HOURS SCHEDULED
Status: DISCONTINUED | OUTPATIENT
Start: 2024-09-19 | End: 2024-09-23 | Stop reason: HOSPADM

## 2024-09-19 RX ORDER — BUPROPION HYDROCHLORIDE 150 MG/1
300 TABLET ORAL DAILY
Status: DISCONTINUED | OUTPATIENT
Start: 2024-09-20 | End: 2024-09-23 | Stop reason: HOSPADM

## 2024-09-19 RX ORDER — SODIUM CHLORIDE 9 MG/ML
INJECTION, SOLUTION INTRAVENOUS PRN
Status: DISCONTINUED | OUTPATIENT
Start: 2024-09-19 | End: 2024-09-20

## 2024-09-19 RX ORDER — METOPROLOL TARTRATE 25 MG/1
25 TABLET, FILM COATED ORAL 2 TIMES DAILY
Status: DISCONTINUED | OUTPATIENT
Start: 2024-09-19 | End: 2024-09-22

## 2024-09-19 RX ORDER — SODIUM CHLORIDE 9 MG/ML
INJECTION, SOLUTION INTRAVENOUS PRN
Status: DISCONTINUED | OUTPATIENT
Start: 2024-09-19 | End: 2024-09-23 | Stop reason: HOSPADM

## 2024-09-19 RX ORDER — ONDANSETRON 4 MG/1
4 TABLET, ORALLY DISINTEGRATING ORAL EVERY 8 HOURS PRN
Status: DISCONTINUED | OUTPATIENT
Start: 2024-09-19 | End: 2024-09-23 | Stop reason: HOSPADM

## 2024-09-19 RX ORDER — ACETAMINOPHEN 325 MG/1
650 TABLET ORAL EVERY 6 HOURS PRN
Status: DISCONTINUED | OUTPATIENT
Start: 2024-09-19 | End: 2024-09-23 | Stop reason: HOSPADM

## 2024-09-19 RX ORDER — DEXTROSE MONOHYDRATE 100 MG/ML
INJECTION, SOLUTION INTRAVENOUS CONTINUOUS PRN
Status: DISCONTINUED | OUTPATIENT
Start: 2024-09-19 | End: 2024-09-23 | Stop reason: HOSPADM

## 2024-09-19 RX ORDER — INSULIN LISPRO 100 [IU]/ML
0-4 INJECTION, SOLUTION INTRAVENOUS; SUBCUTANEOUS EVERY 4 HOURS
Status: DISCONTINUED | OUTPATIENT
Start: 2024-09-19 | End: 2024-09-20

## 2024-09-19 RX ORDER — ONDANSETRON 2 MG/ML
4 INJECTION INTRAMUSCULAR; INTRAVENOUS EVERY 6 HOURS PRN
Status: DISCONTINUED | OUTPATIENT
Start: 2024-09-19 | End: 2024-09-23 | Stop reason: HOSPADM

## 2024-09-19 RX ADMIN — METOPROLOL TARTRATE 25 MG: 25 TABLET, FILM COATED ORAL at 21:27

## 2024-09-19 RX ADMIN — IOPAMIDOL 100 ML: 755 INJECTION, SOLUTION INTRAVENOUS at 17:50

## 2024-09-19 RX ADMIN — PREDNISONE 15 MG: 10 TABLET ORAL at 23:20

## 2024-09-19 RX ADMIN — SODIUM CHLORIDE, PRESERVATIVE FREE 10 ML: 5 INJECTION INTRAVENOUS at 21:27

## 2024-09-19 ASSESSMENT — PAIN DESCRIPTION - ONSET: ONSET: ON-GOING

## 2024-09-19 ASSESSMENT — PAIN DESCRIPTION - ORIENTATION: ORIENTATION: POSTERIOR

## 2024-09-19 ASSESSMENT — PAIN DESCRIPTION - FREQUENCY: FREQUENCY: CONTINUOUS

## 2024-09-19 ASSESSMENT — PAIN - FUNCTIONAL ASSESSMENT
PAIN_FUNCTIONAL_ASSESSMENT: PREVENTS OR INTERFERES SOME ACTIVE ACTIVITIES AND ADLS
PAIN_FUNCTIONAL_ASSESSMENT: 0-10

## 2024-09-19 ASSESSMENT — PAIN DESCRIPTION - DESCRIPTORS: DESCRIPTORS: ACHING;SHARP

## 2024-09-19 ASSESSMENT — PAIN DESCRIPTION - LOCATION: LOCATION: BACK

## 2024-09-19 ASSESSMENT — PAIN SCALES - GENERAL: PAINLEVEL_OUTOF10: 9

## 2024-09-19 ASSESSMENT — PAIN DESCRIPTION - PAIN TYPE: TYPE: ACUTE PAIN
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
## 2024-09-19 NOTE — ED TRIAGE NOTES
Patient is coming in via EMS. Patient was seen at [facility] for generalized pain, weakness and dizziness. Patient was seen 1 week ago and has HGB of 6 today and was 7 last week. Patient has been having vomiting and diarrhea but no blood was noticed. Intermittent confusion at [facility].

## 2024-09-20 ENCOUNTER — APPOINTMENT (OUTPATIENT)
Facility: HOSPITAL | Age: 74
DRG: 812 | End: 2024-09-20
Payer: MEDICARE

## 2024-09-20 LAB
ALBUMIN SERPL-MCNC: 2.6 G/DL (ref 3.5–5)
ALBUMIN/GLOB SERPL: 0.9 (ref 1.1–2.2)
ALP SERPL-CCNC: 138 U/L (ref 45–117)
ALT SERPL-CCNC: 30 U/L (ref 12–78)
AMMONIA PLAS-SCNC: 17 UMOL/L
ANION GAP SERPL CALC-SCNC: 4 MMOL/L (ref 2–12)
APTT PPP: 35 SEC (ref 22.1–31)
AST SERPL-CCNC: 39 U/L (ref 15–37)
BILIRUB SERPL-MCNC: 0.3 MG/DL (ref 0.2–1)
BUN SERPL-MCNC: 25 MG/DL (ref 6–20)
BUN/CREAT SERPL: 20 (ref 12–20)
CALCIUM SERPL-MCNC: 8.4 MG/DL (ref 8.5–10.1)
CHLORIDE SERPL-SCNC: 114 MMOL/L (ref 97–108)
CO2 SERPL-SCNC: 24 MMOL/L (ref 21–32)
CREAT SERPL-MCNC: 1.24 MG/DL (ref 0.55–1.02)
CRP SERPL-MCNC: 9.92 MG/DL (ref 0–0.3)
ERYTHROCYTE [SEDIMENTATION RATE] IN BLOOD: 74 MM/HR (ref 0–30)
FIBRINOGEN PPP-MCNC: 599 MG/DL (ref 200–475)
FOLATE SERPL-MCNC: 33.3 NG/ML (ref 5–21)
GLOBULIN SER CALC-MCNC: 2.8 G/DL (ref 2–4)
GLUCOSE BLD STRIP.AUTO-MCNC: 116 MG/DL (ref 65–117)
GLUCOSE BLD STRIP.AUTO-MCNC: 173 MG/DL (ref 65–117)
GLUCOSE BLD STRIP.AUTO-MCNC: 197 MG/DL (ref 65–117)
GLUCOSE BLD STRIP.AUTO-MCNC: 203 MG/DL (ref 65–117)
GLUCOSE BLD STRIP.AUTO-MCNC: 241 MG/DL (ref 65–117)
GLUCOSE BLD STRIP.AUTO-MCNC: 251 MG/DL (ref 65–117)
GLUCOSE SERPL-MCNC: 175 MG/DL (ref 65–100)
HCT VFR BLD AUTO: 27.1 % (ref 35–47)
HGB BLD-MCNC: 8.2 G/DL (ref 11.5–16)
INR PPP: 1.1 (ref 0.9–1.1)
IRON SATN MFR SERPL: 6 % (ref 20–50)
IRON SERPL-MCNC: 19 UG/DL (ref 35–150)
LACTATE SERPL-SCNC: 0.7 MMOL/L (ref 0.4–2)
PHOSPHATE SERPL-MCNC: 3.9 MG/DL (ref 2.6–4.7)
POTASSIUM SERPL-SCNC: 5.2 MMOL/L (ref 3.5–5.1)
PROT SERPL-MCNC: 5.4 G/DL (ref 6.4–8.2)
PROTHROMBIN TIME: 11.3 SEC (ref 9–11.1)
SERVICE CMNT-IMP: ABNORMAL
SERVICE CMNT-IMP: NORMAL
SODIUM SERPL-SCNC: 142 MMOL/L (ref 136–145)
T4 FREE SERPL-MCNC: 1.2 NG/DL (ref 0.8–1.5)
THERAPEUTIC RANGE: ABNORMAL SECS (ref 58–77)
TIBC SERPL-MCNC: 315 UG/DL (ref 250–450)
TROPONIN I SERPL HS-MCNC: 7 NG/L (ref 0–51)
TSH SERPL DL<=0.05 MIU/L-ACNC: 0.61 UIU/ML (ref 0.36–3.74)
VIT B12 SERPL-MCNC: >2000 PG/ML (ref 193–986)

## 2024-09-20 PROCEDURE — 86200 CCP ANTIBODY: CPT

## 2024-09-20 PROCEDURE — 6370000000 HC RX 637 (ALT 250 FOR IP): Performed by: FAMILY MEDICINE

## 2024-09-20 PROCEDURE — 2580000003 HC RX 258: Performed by: FAMILY MEDICINE

## 2024-09-20 PROCEDURE — 30233N1 TRANSFUSION OF NONAUTOLOGOUS RED BLOOD CELLS INTO PERIPHERAL VEIN, PERCUTANEOUS APPROACH: ICD-10-PCS | Performed by: HOSPITALIST

## 2024-09-20 PROCEDURE — 85610 PROTHROMBIN TIME: CPT

## 2024-09-20 PROCEDURE — 86140 C-REACTIVE PROTEIN: CPT

## 2024-09-20 PROCEDURE — 83550 IRON BINDING TEST: CPT

## 2024-09-20 PROCEDURE — 70450 CT HEAD/BRAIN W/O DYE: CPT

## 2024-09-20 PROCEDURE — 84443 ASSAY THYROID STIM HORMONE: CPT

## 2024-09-20 PROCEDURE — 82607 VITAMIN B-12: CPT

## 2024-09-20 PROCEDURE — 82746 ASSAY OF FOLIC ACID SERUM: CPT

## 2024-09-20 PROCEDURE — 82140 ASSAY OF AMMONIA: CPT

## 2024-09-20 PROCEDURE — 84100 ASSAY OF PHOSPHORUS: CPT

## 2024-09-20 PROCEDURE — 82962 GLUCOSE BLOOD TEST: CPT

## 2024-09-20 PROCEDURE — 84484 ASSAY OF TROPONIN QUANT: CPT

## 2024-09-20 PROCEDURE — 85384 FIBRINOGEN ACTIVITY: CPT

## 2024-09-20 PROCEDURE — 2060000000 HC ICU INTERMEDIATE R&B

## 2024-09-20 PROCEDURE — P9016 RBC LEUKOCYTES REDUCED: HCPCS

## 2024-09-20 PROCEDURE — 80053 COMPREHEN METABOLIC PANEL: CPT

## 2024-09-20 PROCEDURE — 83540 ASSAY OF IRON: CPT

## 2024-09-20 PROCEDURE — 85730 THROMBOPLASTIN TIME PARTIAL: CPT

## 2024-09-20 PROCEDURE — 83605 ASSAY OF LACTIC ACID: CPT

## 2024-09-20 PROCEDURE — 86431 RHEUMATOID FACTOR QUANT: CPT

## 2024-09-20 PROCEDURE — 85018 HEMOGLOBIN: CPT

## 2024-09-20 PROCEDURE — 36430 TRANSFUSION BLD/BLD COMPNT: CPT

## 2024-09-20 PROCEDURE — 85652 RBC SED RATE AUTOMATED: CPT

## 2024-09-20 PROCEDURE — 84439 ASSAY OF FREE THYROXINE: CPT

## 2024-09-20 PROCEDURE — 85014 HEMATOCRIT: CPT

## 2024-09-20 PROCEDURE — 6370000000 HC RX 637 (ALT 250 FOR IP): Performed by: INTERNAL MEDICINE

## 2024-09-20 RX ORDER — ASCORBIC ACID 500 MG
500 TABLET ORAL DAILY
COMMUNITY

## 2024-09-20 RX ORDER — INSULIN LISPRO 100 [IU]/ML
0-4 INJECTION, SOLUTION INTRAVENOUS; SUBCUTANEOUS NIGHTLY
Status: DISCONTINUED | OUTPATIENT
Start: 2024-09-20 | End: 2024-09-22

## 2024-09-20 RX ORDER — PREDNISONE 5 MG/1
10 TABLET ORAL DAILY
Status: DISCONTINUED | OUTPATIENT
Start: 2024-09-21 | End: 2024-09-21

## 2024-09-20 RX ORDER — PHENOL 1.4 %
2 AEROSOL, SPRAY (ML) MUCOUS MEMBRANE DAILY
Status: ON HOLD | COMMUNITY
End: 2024-09-20 | Stop reason: SDUPTHER

## 2024-09-20 RX ORDER — SODIUM CHLORIDE 9 MG/ML
INJECTION, SOLUTION INTRAVENOUS CONTINUOUS
Status: DISCONTINUED | OUTPATIENT
Start: 2024-09-20 | End: 2024-09-20

## 2024-09-20 RX ORDER — DIPHENHYDRAMINE HCL 25 MG
1 TABLET ORAL 3 TIMES DAILY
COMMUNITY

## 2024-09-20 RX ORDER — INSULIN LISPRO 100 [IU]/ML
0-4 INJECTION, SOLUTION INTRAVENOUS; SUBCUTANEOUS
Status: DISCONTINUED | OUTPATIENT
Start: 2024-09-20 | End: 2024-09-22

## 2024-09-20 RX ORDER — CYANOCOBALAMIN (VITAMIN B-12) 1000 MCG
5000 TABLET, EXTENDED RELEASE ORAL DAILY
COMMUNITY

## 2024-09-20 RX ORDER — INSULIN LISPRO 100 [IU]/ML
0-8 INJECTION, SOLUTION INTRAVENOUS; SUBCUTANEOUS
Status: DISCONTINUED | OUTPATIENT
Start: 2024-09-20 | End: 2024-09-20

## 2024-09-20 RX ORDER — GLIPIZIDE 10 MG/1
10 TABLET ORAL
COMMUNITY

## 2024-09-20 RX ADMIN — METOPROLOL TARTRATE 25 MG: 25 TABLET, FILM COATED ORAL at 21:12

## 2024-09-20 RX ADMIN — ACETAMINOPHEN 650 MG: 325 TABLET ORAL at 17:33

## 2024-09-20 RX ADMIN — INSULIN LISPRO 1 UNITS: 100 INJECTION, SOLUTION INTRAVENOUS; SUBCUTANEOUS at 17:53

## 2024-09-20 RX ADMIN — SODIUM CHLORIDE: 9 INJECTION, SOLUTION INTRAVENOUS at 06:54

## 2024-09-20 RX ADMIN — BUPROPION HYDROCHLORIDE 300 MG: 150 TABLET, EXTENDED RELEASE ORAL at 17:54

## 2024-09-20 RX ADMIN — PREDNISONE 15 MG: 10 TABLET ORAL at 09:30

## 2024-09-20 RX ADMIN — ACETAMINOPHEN 650 MG: 325 TABLET ORAL at 22:06

## 2024-09-20 RX ADMIN — LOSARTAN POTASSIUM 50 MG: 50 TABLET, FILM COATED ORAL at 06:40

## 2024-09-20 RX ADMIN — METOPROLOL TARTRATE 25 MG: 25 TABLET, FILM COATED ORAL at 09:30

## 2024-09-20 RX ADMIN — SODIUM CHLORIDE, PRESERVATIVE FREE 10 ML: 5 INJECTION INTRAVENOUS at 09:30

## 2024-09-20 RX ADMIN — SODIUM CHLORIDE, PRESERVATIVE FREE 10 ML: 5 INJECTION INTRAVENOUS at 21:16

## 2024-09-20 RX ADMIN — ESCITALOPRAM OXALATE 10 MG: 10 TABLET ORAL at 09:30

## 2024-09-20 ASSESSMENT — PAIN SCALES - GENERAL
PAINLEVEL_OUTOF10: 10
PAINLEVEL_OUTOF10: 0
PAINLEVEL_OUTOF10: 10
PAINLEVEL_OUTOF10: 0
PAINLEVEL_OUTOF10: 0

## 2024-09-20 ASSESSMENT — PAIN DESCRIPTION - DESCRIPTORS
DESCRIPTORS: STABBING;DISCOMFORT
DESCRIPTORS: ACHING

## 2024-09-20 ASSESSMENT — PAIN DESCRIPTION - LOCATION: LOCATION: LEG;FOOT

## 2024-09-20 ASSESSMENT — PAIN DESCRIPTION - ORIENTATION
ORIENTATION: OTHER (COMMENT)
ORIENTATION: LEFT;RIGHT;LOWER

## 2024-09-20 NOTE — CARE COORDINATION
igned         CM called [facility] to see if the office had contact information for patient's daughter since the emergency contact in her chart was out of service for Ariadna Jauregui, daughter. Patient's contact sheet at [facility] provided a Lilia Ribera (friend) [phone] as emergency contact. CM met with patient at the bedside and she consented for CM calling patient's friend Lilia Ribera and letting her know that patient was in the hospital. CM provided patient's room phone number per patient request. Full assessment from CM to follow.     Christiana De Souza, MARILYNN, RN, ONC, CMSRN  Nurse Care Manager

## 2024-09-20 NOTE — PROGRESS NOTES
[facility] Adult Hospitalist [facility]                                                                               Hospitalist Progress Note  Bijal Burt MD  Answering service: [phone] OR [phone] from in house phone        Date of Service:  2024  NAME:  Erin Galindo  :  1950  MRN:  [id number]       Admission Summary:   Erin Galindo is a 73 y.o. female with past medical history of hypertension, hyperlipidemia, type 2 diabetes mellitus, diabetic retinopathy, CKD, depression, DJD, nonalcoholic fatty liver, hearing loss, heart murmur, pancreatic pseudocyst, vision loss right eye presented to the emergency department via EMS with chief complaint of generalized aches and pains, generalized weakness, dizziness. Today, patient followed up with her gynecologist.  She report generalized aches and pains that was diffuse including shoulders, back, abdomen, legs.  Pain was severe, aching, constant, without specific alleviating factors.  Patient was transported via EMS to the ED.  On arrival, initial recorded vital signs were temperature 97.8 °F, /76, heart rate 96, respiratory rate 20, O2 saturation 93% on room air.  12-lead EKG showed normal sinus rhythm at 66 bpm.  Chest x-ray portable showed clear lungs.  Abnormal labs included chloride 116, BUN 29, creatinine 1.41, GFR 39, CRP 11.30, sedimentation rate 73, albumin 3.0, alkaline phosphatase 145, AST 52, hemoglobin 7.4.   ED consulted hospitalist for admission.  On arrival at the bedside, patient had multiple complaints.  She complains of pain in mostly all over her body.  When asked if she had any blood in stool, she notes that she was unable to provide any details of the when event occurred.  Patient also complains of dizziness and lightheadedness.  No reports of slurred speech, facial droop, focal weakness, new onset numbness/paresthesias.     Interval history / Subjective:   Source of information: patient  This AM pt said

## 2024-09-20 NOTE — CARE COORDINATION
SLIM called [facility] to see if the office had contact information for patient's daughter since the emergency contact listed in her chart was out of service. [facility] provided emergency contact as a friend Lilia Ribera, [phone]. SLIM met with patient at the bedside and she consented for  to call Lilia and asked that I provide Lilia with patient's contact information here in the hospital. Contact information for friend was added to the demographics.     Christiana De Souza, MARILYNN, RN, ONC, CMSRN  Nurse Care Manager

## 2024-09-20 NOTE — ED NOTES
This RN responded to room as had been notified by another RN that pt had removed her IV. Pt changed out of clothes & into gown. Linen changed.

## 2024-09-20 NOTE — PLAN OF CARE
Pt. educated on staying in bed, and pressing the call bell before getting out of bed for safety measures.

## 2024-09-20 NOTE — ED NOTES
Pt notified that her bed is ready & this RN has arrived to take her upstairs. Pt in NAD with even & unlabored respirations. VSS. No requests @ this time. Pt transferred out of ED by this RN while on monitor.

## 2024-09-20 NOTE — H&P
History and Physical    Date of Service:  9/19/2024  Primary Care Provider: Hector Maddox MD  Source of information: The patient and Chart review    Chief Complaint:   Pain      History of Presenting Illness:   Erin Galindo is a 73 y.o. female with past medical history of hypertension, hyperlipidemia, type 2 diabetes mellitus, diabetic retinopathy, CKD, depression, DJD, nonalcoholic fatty liver, hearing loss, heart murmur, pancreatic pseudocyst, vision loss right eye presented to the emergency department via EMS with chief complaint of generalized aches and pains, generalized weakness, dizziness. Today, patient followed up with her gynecologist.  She report generalized aches and pains that was diffuse including shoulders, back, abdomen, legs.  Pain was severe, aching, constant, without specific alleviating factors.  Patient was transported via EMS to the ED.  On arrival, initial recorded vital signs were temperature 97.8 °F, /76, heart rate 96, respiratory rate 20, O2 saturation 93% on room air.  12-lead EKG showed normal sinus rhythm at 66 bpm.  Chest x-ray portable showed clear lungs.  Abnormal labs included chloride 116, BUN 29, creatinine 1.41, GFR 39, CRP 11.30, sedimentation rate 73, albumin 3.0, alkaline phosphatase 145, AST 52, hemoglobin 7.4.   ED consulted hospitalist for admission.  On arrival at the bedside, patient had multiple complaints.  She complains of pain in mostly all over her body.  When asked if she had any blood in stool, she notes that she was unable to provide any details of the when event occurred.  Patient also complains of dizziness and lightheadedness.  No reports of slurred speech, facial droop, focal weakness, new onset numbness/paresthesias.     REVIEW OF SYSTEMS:  Pertinent items are noted in the History of Present Illness.     Past Medical History:   Diagnosis Date    Abdominal discomfort 10/15/14    notes  GI at [facility] [place] 1/21/15note    Acute maxillary  spent 60 minutes of critical care time.  This is time spent at this critically ill patient's bedside actively involved in patient care as well as the coordination of care and discussions with the patient's family.  This does not include any procedural time which has been billed separately.    ADVANCED DIRECTIVE/ CODE STATUS:  FULL CODE as per discussion with patient.   Signed By: Faheem Miramonets MD     September 19, 2024         Please note that this dictation may have been completed with Dragon, the computer voice recognition software.  Quite often unanticipated grammatical, syntax, homophones, and other interpretive errors are inadvertently transcribed by the computer software.  Please disregard these errors.  Please excuse any errors that have escaped final proofreading.

## 2024-09-20 NOTE — ED NOTES
Patient requesting food. This RN notified her of NPO diet with ice chips. Patient provided with ice chips.

## 2024-09-20 NOTE — ED NOTES
Assumed care of pt @ this time. Introduced self to pt. Pt in NAD with even & unlabored respirations. Connected pt to monitoring equipment & VS set to cycle q 30 min. VSS. Stretcher in lowest position, brakes secured, & B/L rails raised. Call bell within pt's reach. No requests @ this time.

## 2024-09-20 NOTE — PROGRESS NOTES
This RN made a call to the patient's child, listed under demographics as Ariadna Jauregui at     [phone], per the patient's request to notify her daughter that she was admitted to the hospital. The number when dialed stated, \"no longer in service.\" This RN called the nursing supervisor Liyah and discussed a plan for the Care Manager to contact the patient's PCP to inquire if they have an updated number for the patient's daughter. This RN will make the care manager aware of the plan.

## 2024-09-20 NOTE — ED NOTES
pain  Pain Location: Back  Pain Orientation: Posterior  Pain Descriptors: Aching, Sharp  Functional Pain Assessment: Prevents or interferes some active activities and ADLs  Pain Type: Acute pain  Pain Frequency: Continuous  Pain Onset: On-going  Non-Pharmaceutical Pain Intervention(s): Rest  Last documented pain score: (0-10 scale) Pain Level: 9  Last documented pain medication administered: n/a     Mental Status:  pt A&Ox4 but is otherwise confused & has short term memory loss  Orientation Level:  A&Ox4  C-SSRS: Risk of Suicide: No Risk    PO Status: Nothing by Mouth  Bedside swallow:    [place] Coma Scale (GCS): Vladimir Coma Scale  Eye Opening: Spontaneous  Best Verbal Response: Oriented  Best Motor Response: Obeys commands  [place] Coma Scale Score: 15    Active LDA's:   Peripheral IV 09/20/24 Right Forearm (Active)   Site Assessment Clean, dry & intact 09/20/24 0101   Line Status Blood return noted;Brisk blood return;Flushed;Normal saline locked 09/20/24 0101   Line Care Connections checked and tightened 09/20/24 0101   Phlebitis Assessment No symptoms 09/20/24 0101   Infiltration Assessment 0 09/20/24 0101   Alcohol Cap Used Yes 09/20/24 0101   Dressing Status New dressing applied 09/20/24 0101   Dressing Type Transparent 09/20/24 0101   Dressing Intervention New 09/20/24 0101     Pertinent or High Risk Medications/Drips: no   Pending Blood Product Administration: yes     Sepsis: Sepsis Risk Score   Predictive Model Details          36 (Low)  Factor Value    Calculated 9/20/2024 01:21 9% O2 Delivery Method ROOM AIR    [facility] EARLY DETECTION OF SEPSIS VERSION 2 Model 9% Total Active Inpatient Meds 19     9% Age 73 years old     6% Pain Scale 9     6% Temperature 99 °F (37.2 °C)     5% Albumin 3.0 g/dL     -5% Duration of Encounter .4 days     -4% Platelet Count 189 K/uL     3% Has Imaging Procedure present     3% BUN 29 MG/DL      Recent Labs     09/19/24  1432   WBC 8.9     Blood Cultures Drawn: No   Repeat LA:  Time Due n/a  Antibiotic Given: No  Fluid Resuscitation: Total needed n/a   VS x 2 post-fluid resuscitation: N/A    Recommendation    Plan for next 24 hours: blood transfusion & then repeat H&H draws  Additional Comments: none     Pending orders: AM labs  Consults ordered: IP CONSULT TO GI    Consulted provider:      If any further questions, please call Sending RN at [phone]  Electronically signed by: Electronically signed by Lily Garcia RN on 9/20/2024 at 1:23 AM

## 2024-09-20 NOTE — CARE COORDINATION
Care Management Initial Assessment       RUR: 20%  Readmission? No  1st IM letter given? Yes - 9/20/24  1st  letter given: No    CM met with pt. She is alert and oriented x4. Demographics and insurance confirmed. Pt lives alone. She is independent with ADL's. No DME owned or used. Pt has no history with home health or rehab services. Plan is to return home. No needs identified at this time. CM following.     Chief Complaint:   Pain   emergency contact friend Lilia Ribera, [phone].    09/20/24 1248   Service Assessment   Patient Orientation Alert and Oriented   Cognition Alert   History Provided By Patient   Primary Caregiver Self   Support Systems Friends/Neighbors   Patient's Healthcare Decision Maker is: Legal Next of Kin   PCP Verified by CM Yes   Last Visit to PCP Within last 6 months   Prior Functional Level Independent in ADLs/IADLs   Current Functional Level Independent in ADLs/IADLs   Can patient return to prior living arrangement Yes   Ability to make needs known: Good   Family able to assist with home care needs: Yes   Would you like for me to discuss the discharge plan with any other family members/significant others, and if so, who? No   Financial Resources Medicare  ([employer])   Social/Functional History   Lives With Alone   Type of Home Apartment   Home Layout One level   Home Access Stairs to enter with rails   Entrance Stairs - Number of Steps 5   Receives Help From Friend(s)   ADL Assistance Independent   Homemaking Assistance Independent   Ambulation Assistance Independent   Transfer Assistance Independent   Mode of Transportation Car   Occupation Retired   Discharge Planning   Type of Residence Apartment   Living Arrangements Alone   Potential Assistance Purchasing Medications No   Patient expects to be discharged to: Apartment     Ivanna Lopez RN, BSN, BSHCM  Case Management [facility]

## 2024-09-20 NOTE — PLAN OF CARE
Problem: Safety - Adult  Goal: Free from fall injury  Outcome: Progressing     Problem: Discharge Planning  Goal: Discharge to home or other facility with appropriate resources  Outcome: Progressing  Flowsheets (Taken 9/20/2024 0308)  Discharge to home or other facility with appropriate resources: Identify barriers to discharge with patient and caregiver     Problem: Pain  Goal: Verbalizes/displays adequate comfort level or baseline comfort level  Outcome: Progressing

## 2024-09-21 ENCOUNTER — ANESTHESIA EVENT (OUTPATIENT)
Facility: HOSPITAL | Age: 74
DRG: 812 | End: 2024-09-21
Payer: MEDICARE

## 2024-09-21 LAB
ABO + RH BLD: NORMAL
ANION GAP SERPL CALC-SCNC: 3 MMOL/L (ref 2–12)
BLD PROD TYP BPU: NORMAL
BLOOD BANK BLOOD PRODUCT EXPIRATION DATE: NORMAL
BLOOD BANK DISPENSE STATUS: NORMAL
BLOOD BANK ISBT PRODUCT BLOOD TYPE: 6200
BLOOD BANK PRODUCT CODE: NORMAL
BLOOD BANK UNIT TYPE AND RH: NORMAL
BLOOD GROUP ANTIBODIES SERPL: NORMAL
BPU ID: NORMAL
BUN SERPL-MCNC: 26 MG/DL (ref 6–20)
BUN/CREAT SERPL: 20 (ref 12–20)
CALCIUM SERPL-MCNC: 8.5 MG/DL (ref 8.5–10.1)
CHLORIDE SERPL-SCNC: 115 MMOL/L (ref 97–108)
CO2 SERPL-SCNC: 23 MMOL/L (ref 21–32)
CREAT SERPL-MCNC: 1.29 MG/DL (ref 0.55–1.02)
CROSSMATCH RESULT: NORMAL
CRP SERPL-MCNC: 5.24 MG/DL (ref 0–0.3)
ERYTHROCYTE [DISTWIDTH] IN BLOOD BY AUTOMATED COUNT: 15.4 % (ref 11.5–14.5)
GLUCOSE BLD STRIP.AUTO-MCNC: 122 MG/DL (ref 65–117)
GLUCOSE BLD STRIP.AUTO-MCNC: 151 MG/DL (ref 65–117)
GLUCOSE BLD STRIP.AUTO-MCNC: 192 MG/DL (ref 65–117)
GLUCOSE BLD STRIP.AUTO-MCNC: 286 MG/DL (ref 65–117)
GLUCOSE SERPL-MCNC: 103 MG/DL (ref 65–100)
HCT VFR BLD AUTO: 25 % (ref 35–47)
HGB BLD-MCNC: 7.5 G/DL (ref 11.5–16)
MAGNESIUM SERPL-MCNC: 2.1 MG/DL (ref 1.6–2.4)
MCH RBC QN AUTO: 26 PG (ref 26–34)
MCHC RBC AUTO-ENTMCNC: 30 G/DL (ref 30–36.5)
MCV RBC AUTO: 86.5 FL (ref 80–99)
NRBC # BLD: 0 K/UL (ref 0–0.01)
NRBC BLD-RTO: 0 PER 100 WBC
PLATELET # BLD AUTO: 172 K/UL (ref 150–400)
PMV BLD AUTO: 10.2 FL (ref 8.9–12.9)
POTASSIUM SERPL-SCNC: 5.1 MMOL/L (ref 3.5–5.1)
RBC # BLD AUTO: 2.89 M/UL (ref 3.8–5.2)
SERVICE CMNT-IMP: ABNORMAL
SODIUM SERPL-SCNC: 141 MMOL/L (ref 136–145)
SPECIMEN EXP DATE BLD: NORMAL
UNIT DIVISION: 0
UNIT ISSUE DATE/TIME: NORMAL
WBC # BLD AUTO: 6.2 K/UL (ref 3.6–11)

## 2024-09-21 PROCEDURE — 85027 COMPLETE CBC AUTOMATED: CPT

## 2024-09-21 PROCEDURE — 99223 1ST HOSP IP/OBS HIGH 75: CPT | Performed by: INTERNAL MEDICINE

## 2024-09-21 PROCEDURE — 2060000000 HC ICU INTERMEDIATE R&B

## 2024-09-21 PROCEDURE — 36415 COLL VENOUS BLD VENIPUNCTURE: CPT

## 2024-09-21 PROCEDURE — 2580000003 HC RX 258: Performed by: INTERNAL MEDICINE

## 2024-09-21 PROCEDURE — 6360000002 HC RX W HCPCS: Performed by: INTERNAL MEDICINE

## 2024-09-21 PROCEDURE — 2580000003 HC RX 258: Performed by: FAMILY MEDICINE

## 2024-09-21 PROCEDURE — 97116 GAIT TRAINING THERAPY: CPT

## 2024-09-21 PROCEDURE — 82962 GLUCOSE BLOOD TEST: CPT

## 2024-09-21 PROCEDURE — 86140 C-REACTIVE PROTEIN: CPT

## 2024-09-21 PROCEDURE — 80048 BASIC METABOLIC PNL TOTAL CA: CPT

## 2024-09-21 PROCEDURE — 6370000000 HC RX 637 (ALT 250 FOR IP): Performed by: INTERNAL MEDICINE

## 2024-09-21 PROCEDURE — 83735 ASSAY OF MAGNESIUM: CPT

## 2024-09-21 PROCEDURE — 6370000000 HC RX 637 (ALT 250 FOR IP): Performed by: FAMILY MEDICINE

## 2024-09-21 PROCEDURE — 97165 OT EVAL LOW COMPLEX 30 MIN: CPT

## 2024-09-21 PROCEDURE — 1100000000 HC RM PRIVATE

## 2024-09-21 PROCEDURE — 97535 SELF CARE MNGMENT TRAINING: CPT

## 2024-09-21 PROCEDURE — 97161 PT EVAL LOW COMPLEX 20 MIN: CPT

## 2024-09-21 RX ORDER — HYDRALAZINE HYDROCHLORIDE 20 MG/ML
15 INJECTION INTRAMUSCULAR; INTRAVENOUS EVERY 4 HOURS PRN
Status: DISCONTINUED | OUTPATIENT
Start: 2024-09-21 | End: 2024-09-23 | Stop reason: HOSPADM

## 2024-09-21 RX ADMIN — BUPROPION HYDROCHLORIDE 300 MG: 150 TABLET, EXTENDED RELEASE ORAL at 08:48

## 2024-09-21 RX ADMIN — METOPROLOL TARTRATE 25 MG: 25 TABLET, FILM COATED ORAL at 08:48

## 2024-09-21 RX ADMIN — SODIUM CHLORIDE, PRESERVATIVE FREE 10 ML: 5 INJECTION INTRAVENOUS at 20:58

## 2024-09-21 RX ADMIN — INSULIN LISPRO 2 UNITS: 100 INJECTION, SOLUTION INTRAVENOUS; SUBCUTANEOUS at 13:24

## 2024-09-21 RX ADMIN — LOSARTAN POTASSIUM 50 MG: 50 TABLET, FILM COATED ORAL at 06:04

## 2024-09-21 RX ADMIN — PREDNISONE 10 MG: 5 TABLET ORAL at 08:48

## 2024-09-21 RX ADMIN — ACETAMINOPHEN 650 MG: 325 TABLET ORAL at 16:02

## 2024-09-21 RX ADMIN — METOPROLOL TARTRATE 25 MG: 25 TABLET, FILM COATED ORAL at 20:57

## 2024-09-21 RX ADMIN — POLYETHYLENE GLYCOL-3350 AND ELECTROLYTES 4000 ML: 236; 6.74; 5.86; 2.97; 22.74 POWDER, FOR SOLUTION ORAL at 15:53

## 2024-09-21 RX ADMIN — SODIUM CHLORIDE, PRESERVATIVE FREE 10 ML: 5 INJECTION INTRAVENOUS at 08:48

## 2024-09-21 RX ADMIN — SODIUM CHLORIDE 125 MG: 9 INJECTION, SOLUTION INTRAVENOUS at 12:55

## 2024-09-21 RX ADMIN — ESCITALOPRAM OXALATE 10 MG: 10 TABLET ORAL at 08:48

## 2024-09-21 RX ADMIN — HYDRALAZINE HYDROCHLORIDE 15 MG: 20 INJECTION INTRAMUSCULAR; INTRAVENOUS at 16:03

## 2024-09-21 ASSESSMENT — PAIN SCALES - GENERAL
PAINLEVEL_OUTOF10: 4
PAINLEVEL_OUTOF10: 3
PAINLEVEL_OUTOF10: 4

## 2024-09-21 ASSESSMENT — PAIN DESCRIPTION - LOCATION
LOCATION: LEG
LOCATION: LEG;FOOT
LOCATION: LEG;FOOT

## 2024-09-21 NOTE — PLAN OF CARE
Problem: Safety - Adult  Goal: Free from fall injury  9/21/2024 0902 by Ivania Vora RN  Outcome: Progressing  9/21/2024 0305 by Barbra Garcias RN  Outcome: Progressing     Problem: Discharge Planning  Goal: Discharge to home or other facility with appropriate resources  9/21/2024 0902 by Ivania Vora RN  Outcome: Progressing  Flowsheets (Taken 9/21/2024 0848)  Discharge to home or other facility with appropriate resources: Identify barriers to discharge with patient and caregiver  9/21/2024 0305 by Barbra Garcias, RN  Outcome: Progressing  Flowsheets (Taken 9/20/2024 2000)  Discharge to home or other facility with appropriate resources: Identify barriers to discharge with patient and caregiver

## 2024-09-21 NOTE — PROGRESS NOTES
Orders received, chart reviewed and patient evaluated by occupational therapy. Pending progression with skilled acute occupational therapy, recommend:    Intermittent occupational therapy up to 2-3x/week in previous living setting    Recommend with nursing patient to complete as able in order to maintain strength, endurance and independence: OOB to chair 3x/day, ADLs with CGA and performing toileting with 1 assist. Thank you for your assistance.     Full evaluation to follow.

## 2024-09-21 NOTE — CONSULTS
appears appropriate with judgement intact.  Lymphatic:  No cervical or supraclavicular adenopathy.    Data Review     Recent Labs     09/19/24  1432 09/19/24  2141 09/20/24  0835 09/21/24  0549   WBC 8.9  --   --  6.2   HGB 7.4*   < > 8.2* 7.5*   HCT 25.6*   < > 27.1* 25.0*     --   --  172    < > = values in this interval not displayed.     Recent Labs     09/20/24  0543 09/21/24  0549    141   K 5.2* 5.1   * 115*   CO2 24 23   BUN 25* 26*   PHOS 3.9  --      Recent Labs     09/19/24  1432 09/20/24  0543   GLOB 3.0 2.8     Recent Labs     09/20/24  0543   INR 1.1   APTT 35.0*       Laboratory:    Recent Results (from the past 24 hour(s))   POCT Glucose    Collection Time: 09/20/24  5:23 PM   Result Value Ref Range    POC Glucose 241 (H) 65 - 117 mg/dL    Performed by: BALTA De La Vega    POCT Glucose    Collection Time: 09/20/24  8:19 PM   Result Value Ref Range    POC Glucose 251 (H) 65 - 117 mg/dL    Performed by: ANDERSON Sabillon   PCT    Basic Metabolic Panel    Collection Time: 09/21/24  5:49 AM   Result Value Ref Range    Sodium 141 136 - 145 mmol/L    Potassium 5.1 3.5 - 5.1 mmol/L    Chloride 115 (H) 97 - 108 mmol/L    CO2 23 21 - 32 mmol/L    Anion Gap 3 2 - 12 mmol/L    Glucose 103 (H) 65 - 100 mg/dL    BUN 26 (H) 6 - 20 MG/DL    Creatinine 1.29 (H) 0.55 - 1.02 MG/DL    BUN/Creatinine Ratio 20 12 - 20      Est, Glom Filt Rate 43 (L) >60 ml/min/1.73m2    Calcium 8.5 8.5 - 10.1 MG/DL   Magnesium    Collection Time: 09/21/24  5:49 AM   Result Value Ref Range    Magnesium 2.1 1.6 - 2.4 mg/dL   CBC    Collection Time: 09/21/24  5:49 AM   Result Value Ref Range    WBC 6.2 3.6 - 11.0 K/uL    RBC 2.89 (L) 3.80 - 5.20 M/uL    Hemoglobin 7.5 (L) 11.5 - 16.0 g/dL    Hematocrit 25.0 (L) 35.0 - 47.0 %    MCV 86.5 80.0 - 99.0 FL    MCH 26.0 26.0 - 34.0 PG    MCHC 30.0 30.0 - 36.5 g/dL    RDW 15.4 (H) 11.5 - 14.5 %    Platelets 172 150 - 400 K/uL    MPV 10.2 8.9 - 12.9 FL    Nucleated RBCs 0.0 0

## 2024-09-21 NOTE — PLAN OF CARE
Stand by assistance;Based on clinical judgement     LE Bathing: Stand by assistance;Based on clinical judgement     UE Dressing: Stand by assistance;Based on clinical judgement     LE Dressing: Stand by assistance;Based on clinical judgement     Toileting: Stand by assistance  Toileting Skilled Clinical Factors: for riky care    ADL Intervention and task modifications:    Patient with CGA/SBA for functional mobility using RW. Patient required min A for toilet transfer but only SBA for toileting and to wash hands at sink.                                                                                                                                                                                                                                      Barthel Index:    Barthel Index Scale  Feeding: Independent, Able to apply any necessary device. Feeds in reasonable time  Bathing: Cannot perform activity  Grooming: Washes face, kwan hair, brushes teeth, shaves (manages plug if electric razor)  Dressing: Independent, Ties shoes, fasteners, applies braces  Bowel Control: No accidents. Able to use enema or suppository if needed  Bladder Control: No accidents. Able to care for collecting device, if used  Toilet Transfers: Needs help for balance, handling clothes or toilet paper  Chair/Bed Trannsfers: Minimum assistance or supervision required  Ambulation: Cannot perform activity  Stairs: Cannot perform activity  Total Barthel Index Score: 60       The Barthel ADL Index: Guidelines  1. The index should be used as a record of what a patient does, not as a record of what a patient could do.  2. The main aim is to establish degree of independence from any help, physical or verbal, however minor and for whatever reason.  3. The need for supervision renders the patient not independent.  4. A patient's performance should be established using the best available evidence. Asking the patient, friends/relatives and nurses are the usual  sources, but direct observation and common sense are also important. However direct testing is not needed.  5. Usually the patient's performance over the preceding 24-48 hours is important, but occasionally longer periods will be relevant.  6. Middle categories imply that the patient supplies over 50 per cent of the effort.  7. Use of aids to be independent is allowed.    Score Interpretation (from Wallace 1997)    Independent   60-79 Minimally independent   40-59 Partially dependent   20-39 Very dependent   <20 Totally dependent     -Lynda Elizalde., Barthel DMeaghanW. (1965). Functional evaluation: the Barthel Index. Md State Med J (142.  -LISA Gonsalez, ART Becerra (1997). The Barthel activities of daily living index: self-reporting versus actual performance in the old (> or = 75 years). Journal of American Geriatric Society 45(7), 832-836.   -MICHELLE Bustamante, PITER Sanchez., Wale, J.A., Hamm, A.J. (1999). Measuring the change in disability after inpatient rehabilitation; comparison of the responsiveness of the Barthel Index and Functional [place] Measure. Journal of Neurology, Neurosurgery, and Psychiatry, 66(4), 480-484.  -David Gaston, N.J.A, DEAN Collado, & Wanda, M.A. (2004) Assessment of post-stroke quality of life in cost-effectiveness studies: The usefulness of the Barthel Index and the EuroQoL-5D. Quality of Life Research, 13, 427-43                                                                                                                                                                                                                                 Pain Rating:  10/10     Pain Intervention(s):   nursing notified, rest, and repositioning    Activity Tolerance:   Good    After treatment:   Patient left in no apparent distress sitting up in chair, Call bell within reach, Bed/ chair alarm activated, Updated patient's board on functional status and mobility recommendations, and

## 2024-09-21 NOTE — PLAN OF CARE
Problem: Physical Therapy - Adult  Goal: By Discharge: Performs mobility at highest level of function for planned discharge setting.  See evaluation for individualized goals.  Description: FUNCTIONAL STATUS PRIOR TO ADMISSION: Patient lived alone and was IND/MOD I with ADLs and functional mobility. Patient recalled a significant history of falls. Patient appears to have limited support.     HOME SUPPORT PRIOR TO ADMISSION: The patient lived alone with no local support.    Physical Therapy Goals  Initiated 9/21/2024  1.  Patient will move from supine to sit and sit to supine, scoot up and down, and roll side to side in bed with independence within 7 day(s).    2.  Patient will perform sit to stand with modified independence within 7 day(s).  3.  Patient will transfer from bed to chair and chair to bed with modified independence using the least restrictive device within 7 day(s).  4.  Patient will ambulate with modified independence for 150 feet with the least restrictive device within 7 day(s).   5.  Patient will ascend/descend 5 stairs with single handrail(s) with modified independence within 7 day(s).    Outcome: Progressing     PHYSICAL THERAPY EVALUATION    Patient: Erin Galindo (73 y.o. female)  Date: 9/21/2024  Primary Diagnosis: GI bleed [K92.2]  Heme positive stool [R19.5]  Anemia, unspecified type [D64.9]  Acute bilateral low back pain, unspecified whether sciatica present [M54.50]  Procedure(s) (LRB):  COLONOSCOPY DIAGNOSTIC (N/A)  ESOPHAGOGASTRODUODENOSCOPY (N/A)     Precautions:                        ASSESSMENT :   DEFICITS/IMPAIRMENTS:   The patient is limited by decreased functional mobility and increased pain levels (spontaneous sharp/ stabbing pain in B thighs; abdominal bloating-type pain). Patient recalls a significant history of falls.     Overall, patient able to perform all functional tasks/mobility listed below with contact guard-supervision level of assistance and rolling walker  support. No LOB episodes occurred during ambulation trial (25 ft, x 2). VSS at rest and with exertion.     Of note, patient had significant difficulty standing from low toilet height - required moderate assistance (provided with toilet riser at conclusion of session).     Recommend focus on higher level balance challenges next session as patient does live alone with limited support.    Patient will benefit from skilled intervention to address the above impairments.    Functional Outcome Measure:  The patient scored 60/100 on the Barthel Index outcome measure which is indicative of a 40% impaired ability to independently perform ADLs and functional tasks.           PLAN :  Recommendations and Planned Interventions:   bed mobility training, transfer training, gait training, therapeutic exercises, and therapeutic activities    Frequency/Duration: Patient will be followed by physical therapy to address goals, PT Plan of Care: 4 times/week to address goals.    Recommendation for discharge: (in order for the patient to meet his/her long term goals):   Intermittent physical therapy up to 2-3x/week in previous living setting    Other factors to consider for discharge: no local support    IF patient discharges home will need the following DME:  Appears to own appropriate DME                SUBJECTIVE:   Patient stated “I think I need this glass or extra bone cut out of me.”    OBJECTIVE DATA SUMMARY:       Past Medical History:   Diagnosis Date    Abdominal discomfort 10/15/14    notes  GI at [facility] 1/21/15note    Acute maxillary sinusitis 01/29/2017    [place] [facility] note  and then to ER [facility] 2/10/17    Advance directive discussed with patient 10/06/2015    Advance directive discussed with patient 06/06/2016    Allergic rhinitis     Cancer (HCC)     breast dr gloria ashraf [phone]    Carotid bruit present     CKD (chronic kidney disease)     notes from [place] [facility] 5/2015    Depression     Diabetes (formerly [facility])

## 2024-09-21 NOTE — PROGRESS NOTES
[facility]                                                                               Hospitalist Progress Note  Bijal Burt MD  Answering service: [phone] OR [phone] from in house phone        Date of Service:  2024  NAME:  Erin Galindo  :  1950  MRN:  [id number]       Admission Summary:   Erin Galindo is a 73 y.o. female with past medical history of hypertension, hyperlipidemia, type 2 diabetes mellitus, diabetic retinopathy, CKD, depression, DJD, nonalcoholic fatty liver, hearing loss, heart murmur, pancreatic pseudocyst, vision loss right eye presented to the emergency department via EMS with chief complaint of generalized aches and pains, generalized weakness, dizziness. Today, patient followed up with her gynecologist.  She report generalized aches and pains that was diffuse including shoulders, back, abdomen, legs.  Pain was severe, aching, constant, without specific alleviating factors.  Patient was transported via EMS to the ED.  On arrival, initial recorded vital signs were temperature 97.8 °F, /76, heart rate 96, respiratory rate 20, O2 saturation 93% on room air.  12-lead EKG showed normal sinus rhythm at 66 bpm.  Chest x-ray portable showed clear lungs.  Abnormal labs included chloride 116, BUN 29, creatinine 1.41, GFR 39, CRP 11.30, sedimentation rate 73, albumin 3.0, alkaline phosphatase 145, AST 52, hemoglobin 7.4.   ED consulted hospitalist for admission.  On arrival at the bedside, patient had multiple complaints.  She complains of pain in mostly all over her body.  When asked if she had any blood in stool, she notes that she was unable to provide any details of the when event occurred.  Patient also complains of dizziness and lightheadedness.  No reports of slurred speech, facial droop, focal weakness, new onset numbness/paresthesias.     Interval history / Subjective:   Source of information: patient  Generalized pain  with appropriate clinical/nonclinical/ nursing providers based on care coordination needs.     Patients current active Medications were reviewed, considered, added and adjusted based on the clinical condition today.      Medications Reviewed:     Current Facility-Administered Medications   Medication Dose Route Frequency    insulin lispro (HUMALOG,ADMELOG) injection vial 0-4 Units  0-4 Units SubCUTAneous Nightly    predniSONE (DELTASONE) tablet 10 mg  10 mg Oral Daily    insulin lispro (HUMALOG,ADMELOG) injection vial 0-4 Units  0-4 Units SubCUTAneous TID WC    sodium chloride flush 0.9 % injection 5-40 mL  5-40 mL IntraVENous 2 times per day    sodium chloride flush 0.9 % injection 5-40 mL  5-40 mL IntraVENous PRN    0.9 % sodium chloride infusion   IntraVENous PRN    ondansetron (ZOFRAN-ODT) disintegrating tablet 4 mg  4 mg Oral Q8H PRN    Or    ondansetron (ZOFRAN) injection 4 mg  4 mg IntraVENous Q6H PRN    acetaminophen (TYLENOL) tablet 650 mg  650 mg Oral Q6H PRN    Or    acetaminophen (TYLENOL) suppository 650 mg  650 mg Rectal Q6H PRN    glucose chewable tablet 16 g  4 tablet Oral PRN    dextrose bolus 10% 125 mL  125 mL IntraVENous PRN    Or    dextrose bolus 10% 250 mL  250 mL IntraVENous PRN    glucagon injection 1 mg  1 mg SubCUTAneous PRN    dextrose 10 % infusion   IntraVENous Continuous PRN    buPROPion (WELLBUTRIN XL) extended release tablet 300 mg  300 mg Oral Daily    escitalopram (LEXAPRO) tablet 10 mg  10 mg Oral Daily    losartan (COZAAR) tablet 50 mg  50 mg Oral Daily    metoprolol tartrate (LOPRESSOR) tablet 25 mg  25 mg Oral BID     ______________________________________________________________________  EXPECTED LENGTH OF STAY: 4  ACTUAL LENGTH OF STAY:          2                 Bijal Burt MD

## 2024-09-22 ENCOUNTER — ANESTHESIA (OUTPATIENT)
Facility: HOSPITAL | Age: 74
DRG: 812 | End: 2024-09-22
Payer: MEDICARE

## 2024-09-22 LAB
ANION GAP SERPL CALC-SCNC: 5 MMOL/L (ref 2–12)
BUN SERPL-MCNC: 20 MG/DL (ref 6–20)
BUN/CREAT SERPL: 15 (ref 12–20)
CALCIUM SERPL-MCNC: 8.5 MG/DL (ref 8.5–10.1)
CHLORIDE SERPL-SCNC: 114 MMOL/L (ref 97–108)
CO2 SERPL-SCNC: 25 MMOL/L (ref 21–32)
CREAT SERPL-MCNC: 1.34 MG/DL (ref 0.55–1.02)
GLUCOSE BLD STRIP.AUTO-MCNC: 109 MG/DL (ref 65–117)
GLUCOSE BLD STRIP.AUTO-MCNC: 111 MG/DL (ref 65–117)
GLUCOSE BLD STRIP.AUTO-MCNC: 91 MG/DL (ref 65–117)
GLUCOSE SERPL-MCNC: 90 MG/DL (ref 65–100)
HGB BLD-MCNC: 7.6 G/DL (ref 11.5–16)
POTASSIUM SERPL-SCNC: 4.5 MMOL/L (ref 3.5–5.1)
SERVICE CMNT-IMP: NORMAL
SODIUM SERPL-SCNC: 144 MMOL/L (ref 136–145)

## 2024-09-22 PROCEDURE — 3600000002 HC SURGERY LEVEL 2 BASE: Performed by: INTERNAL MEDICINE

## 2024-09-22 PROCEDURE — 43239 EGD BIOPSY SINGLE/MULTIPLE: CPT | Performed by: INTERNAL MEDICINE

## 2024-09-22 PROCEDURE — 2720000010 HC SURG SUPPLY STERILE: Performed by: INTERNAL MEDICINE

## 2024-09-22 PROCEDURE — 2500000003 HC RX 250 WO HCPCS: Performed by: NURSE ANESTHETIST, CERTIFIED REGISTERED

## 2024-09-22 PROCEDURE — 2709999900 HC NON-CHARGEABLE SUPPLY: Performed by: INTERNAL MEDICINE

## 2024-09-22 PROCEDURE — 45380 COLONOSCOPY AND BIOPSY: CPT | Performed by: INTERNAL MEDICINE

## 2024-09-22 PROCEDURE — 36415 COLL VENOUS BLD VENIPUNCTURE: CPT

## 2024-09-22 PROCEDURE — 2580000003 HC RX 258: Performed by: NURSE ANESTHETIST, CERTIFIED REGISTERED

## 2024-09-22 PROCEDURE — 2580000003 HC RX 258: Performed by: INTERNAL MEDICINE

## 2024-09-22 PROCEDURE — 1100000000 HC RM PRIVATE

## 2024-09-22 PROCEDURE — 6370000000 HC RX 637 (ALT 250 FOR IP): Performed by: INTERNAL MEDICINE

## 2024-09-22 PROCEDURE — 0DBM8ZX EXCISION OF DESCENDING COLON, VIA NATURAL OR ARTIFICIAL OPENING ENDOSCOPIC, DIAGNOSTIC: ICD-10-PCS | Performed by: INTERNAL MEDICINE

## 2024-09-22 PROCEDURE — 80048 BASIC METABOLIC PNL TOTAL CA: CPT

## 2024-09-22 PROCEDURE — 82962 GLUCOSE BLOOD TEST: CPT

## 2024-09-22 PROCEDURE — 7100000001 HC PACU RECOVERY - ADDTL 15 MIN: Performed by: INTERNAL MEDICINE

## 2024-09-22 PROCEDURE — 0DBK8ZX EXCISION OF ASCENDING COLON, VIA NATURAL OR ARTIFICIAL OPENING ENDOSCOPIC, DIAGNOSTIC: ICD-10-PCS | Performed by: INTERNAL MEDICINE

## 2024-09-22 PROCEDURE — 6370000000 HC RX 637 (ALT 250 FOR IP): Performed by: FAMILY MEDICINE

## 2024-09-22 PROCEDURE — 7100000000 HC PACU RECOVERY - FIRST 15 MIN: Performed by: INTERNAL MEDICINE

## 2024-09-22 PROCEDURE — 0DBN8ZX EXCISION OF SIGMOID COLON, VIA NATURAL OR ARTIFICIAL OPENING ENDOSCOPIC, DIAGNOSTIC: ICD-10-PCS | Performed by: INTERNAL MEDICINE

## 2024-09-22 PROCEDURE — 2580000003 HC RX 258: Performed by: FAMILY MEDICINE

## 2024-09-22 PROCEDURE — 6360000002 HC RX W HCPCS: Performed by: NURSE ANESTHETIST, CERTIFIED REGISTERED

## 2024-09-22 PROCEDURE — 3600000012 HC SURGERY LEVEL 2 ADDTL 15MIN: Performed by: INTERNAL MEDICINE

## 2024-09-22 PROCEDURE — 0DB68ZX EXCISION OF STOMACH, VIA NATURAL OR ARTIFICIAL OPENING ENDOSCOPIC, DIAGNOSTIC: ICD-10-PCS | Performed by: INTERNAL MEDICINE

## 2024-09-22 PROCEDURE — 85018 HEMOGLOBIN: CPT

## 2024-09-22 PROCEDURE — 3700000000 HC ANESTHESIA ATTENDED CARE: Performed by: INTERNAL MEDICINE

## 2024-09-22 PROCEDURE — 3700000001 HC ADD 15 MINUTES (ANESTHESIA): Performed by: INTERNAL MEDICINE

## 2024-09-22 PROCEDURE — 0DBL8ZX EXCISION OF TRANSVERSE COLON, VIA NATURAL OR ARTIFICIAL OPENING ENDOSCOPIC, DIAGNOSTIC: ICD-10-PCS | Performed by: INTERNAL MEDICINE

## 2024-09-22 PROCEDURE — 45385 COLONOSCOPY W/LESION REMOVAL: CPT | Performed by: INTERNAL MEDICINE

## 2024-09-22 PROCEDURE — 6360000002 HC RX W HCPCS: Performed by: INTERNAL MEDICINE

## 2024-09-22 PROCEDURE — 88305 TISSUE EXAM BY PATHOLOGIST: CPT

## 2024-09-22 RX ORDER — LIDOCAINE HYDROCHLORIDE 20 MG/ML
INJECTION, SOLUTION EPIDURAL; INFILTRATION; INTRACAUDAL; PERINEURAL
Status: DISCONTINUED | OUTPATIENT
Start: 2024-09-22 | End: 2024-09-22 | Stop reason: SDUPTHER

## 2024-09-22 RX ORDER — OXYCODONE HYDROCHLORIDE 5 MG/1
5 TABLET ORAL
Status: CANCELLED | OUTPATIENT
Start: 2024-09-22 | End: 2024-09-23

## 2024-09-22 RX ORDER — SODIUM CHLORIDE, SODIUM LACTATE, POTASSIUM CHLORIDE, CALCIUM CHLORIDE 600; 310; 30; 20 MG/100ML; MG/100ML; MG/100ML; MG/100ML
INJECTION, SOLUTION INTRAVENOUS
Status: DISCONTINUED | OUTPATIENT
Start: 2024-09-22 | End: 2024-09-22 | Stop reason: SDUPTHER

## 2024-09-22 RX ORDER — SODIUM CHLORIDE 0.9 % (FLUSH) 0.9 %
5-40 SYRINGE (ML) INJECTION EVERY 12 HOURS SCHEDULED
Status: CANCELLED | OUTPATIENT
Start: 2024-09-22

## 2024-09-22 RX ORDER — SODIUM CHLORIDE 9 MG/ML
INJECTION, SOLUTION INTRAVENOUS PRN
Status: CANCELLED | OUTPATIENT
Start: 2024-09-22

## 2024-09-22 RX ORDER — PANTOPRAZOLE SODIUM 40 MG/1
40 TABLET, DELAYED RELEASE ORAL
Status: DISCONTINUED | OUTPATIENT
Start: 2024-09-23 | End: 2024-09-23 | Stop reason: HOSPADM

## 2024-09-22 RX ORDER — NALOXONE HYDROCHLORIDE 0.4 MG/ML
INJECTION, SOLUTION INTRAMUSCULAR; INTRAVENOUS; SUBCUTANEOUS PRN
Status: CANCELLED | OUTPATIENT
Start: 2024-09-22

## 2024-09-22 RX ORDER — PROPOFOL 10 MG/ML
INJECTION, EMULSION INTRAVENOUS
Status: DISCONTINUED | OUTPATIENT
Start: 2024-09-22 | End: 2024-09-22 | Stop reason: SDUPTHER

## 2024-09-22 RX ORDER — HYDRALAZINE HYDROCHLORIDE 20 MG/ML
10 INJECTION INTRAMUSCULAR; INTRAVENOUS ONCE
Status: CANCELLED | OUTPATIENT
Start: 2024-09-22 | End: 2024-09-22

## 2024-09-22 RX ORDER — PROCHLORPERAZINE EDISYLATE 5 MG/ML
5 INJECTION INTRAMUSCULAR; INTRAVENOUS
Status: CANCELLED | OUTPATIENT
Start: 2024-09-22 | End: 2024-09-23

## 2024-09-22 RX ORDER — ONDANSETRON 2 MG/ML
4 INJECTION INTRAMUSCULAR; INTRAVENOUS
Status: CANCELLED | OUTPATIENT
Start: 2024-09-22 | End: 2024-09-23

## 2024-09-22 RX ORDER — FENTANYL CITRATE 50 UG/ML
25 INJECTION, SOLUTION INTRAMUSCULAR; INTRAVENOUS EVERY 5 MIN PRN
Status: CANCELLED | OUTPATIENT
Start: 2024-09-22

## 2024-09-22 RX ORDER — CARVEDILOL 12.5 MG/1
12.5 TABLET ORAL 2 TIMES DAILY WITH MEALS
Status: DISCONTINUED | OUTPATIENT
Start: 2024-09-22 | End: 2024-09-23 | Stop reason: HOSPADM

## 2024-09-22 RX ORDER — SODIUM CHLORIDE 0.9 % (FLUSH) 0.9 %
5-40 SYRINGE (ML) INJECTION PRN
Status: CANCELLED | OUTPATIENT
Start: 2024-09-22

## 2024-09-22 RX ADMIN — ACETAMINOPHEN 650 MG: 325 TABLET ORAL at 08:31

## 2024-09-22 RX ADMIN — PROPOFOL 25 MG: 10 INJECTION, EMULSION INTRAVENOUS at 16:41

## 2024-09-22 RX ADMIN — PROPOFOL 25 MG: 10 INJECTION, EMULSION INTRAVENOUS at 16:47

## 2024-09-22 RX ADMIN — BUPROPION HYDROCHLORIDE 300 MG: 150 TABLET, EXTENDED RELEASE ORAL at 08:31

## 2024-09-22 RX ADMIN — PROPOFOL 25 MG: 10 INJECTION, EMULSION INTRAVENOUS at 16:38

## 2024-09-22 RX ADMIN — HYDRALAZINE HYDROCHLORIDE 15 MG: 20 INJECTION INTRAMUSCULAR; INTRAVENOUS at 02:44

## 2024-09-22 RX ADMIN — SODIUM CHLORIDE, PRESERVATIVE FREE 10 ML: 5 INJECTION INTRAVENOUS at 21:59

## 2024-09-22 RX ADMIN — PROPOFOL 25 MG: 10 INJECTION, EMULSION INTRAVENOUS at 16:50

## 2024-09-22 RX ADMIN — LOSARTAN POTASSIUM 50 MG: 50 TABLET, FILM COATED ORAL at 05:11

## 2024-09-22 RX ADMIN — SODIUM CHLORIDE, PRESERVATIVE FREE 10 ML: 5 INJECTION INTRAVENOUS at 08:39

## 2024-09-22 RX ADMIN — SODIUM CHLORIDE, POTASSIUM CHLORIDE, SODIUM LACTATE AND CALCIUM CHLORIDE: 600; 310; 30; 20 INJECTION, SOLUTION INTRAVENOUS at 16:00

## 2024-09-22 RX ADMIN — PROPOFOL 25 MG: 10 INJECTION, EMULSION INTRAVENOUS at 16:34

## 2024-09-22 RX ADMIN — LIDOCAINE HYDROCHLORIDE 50 MG: 20 INJECTION, SOLUTION EPIDURAL; INFILTRATION; INTRACAUDAL; PERINEURAL at 16:33

## 2024-09-22 RX ADMIN — PROPOFOL 25 MG: 10 INJECTION, EMULSION INTRAVENOUS at 16:35

## 2024-09-22 RX ADMIN — ESCITALOPRAM OXALATE 10 MG: 10 TABLET ORAL at 08:32

## 2024-09-22 RX ADMIN — METOPROLOL TARTRATE 25 MG: 25 TABLET, FILM COATED ORAL at 08:31

## 2024-09-22 RX ADMIN — PROPOFOL 25 MG: 10 INJECTION, EMULSION INTRAVENOUS at 16:44

## 2024-09-22 RX ADMIN — CARVEDILOL 12.5 MG: 12.5 TABLET, FILM COATED ORAL at 18:20

## 2024-09-22 RX ADMIN — SODIUM CHLORIDE 125 MG: 9 INJECTION, SOLUTION INTRAVENOUS at 18:21

## 2024-09-22 RX ADMIN — PROPOFOL 50 MG: 10 INJECTION, EMULSION INTRAVENOUS at 16:33

## 2024-09-22 ASSESSMENT — PAIN DESCRIPTION - ORIENTATION: ORIENTATION: RIGHT;LEFT

## 2024-09-22 ASSESSMENT — PAIN DESCRIPTION - LOCATION
LOCATION: LEG
LOCATION: LEG

## 2024-09-22 ASSESSMENT — PAIN SCALES - GENERAL
PAINLEVEL_OUTOF10: 4
PAINLEVEL_OUTOF10: 0
PAINLEVEL_OUTOF10: 2
PAINLEVEL_OUTOF10: 4

## 2024-09-22 ASSESSMENT — PAIN - FUNCTIONAL ASSESSMENT: PAIN_FUNCTIONAL_ASSESSMENT: ADULT NONVERBAL PAIN SCALE (NPVS)

## 2024-09-22 ASSESSMENT — PAIN DESCRIPTION - DESCRIPTORS: DESCRIPTORS: ACHING

## 2024-09-22 NOTE — PERIOP NOTE
TRANSFER - OUT REPORT:    Verbal report given to Nurse (name) on Erin Galindo  being transferred to 209(unit) for routine post-op       Report consisted of patient’s Situation, Background, Assessment and   Recommendations(SBAR).     Time Pre op antibiotic given:none  Anesthesia Stop time: 1708    Information from the following report(s) SBAR and Procedure Summary was reviewed with the receiving nurse.    Opportunity for questions and clarification was provided.     Is the patient on 02? No        Is the patient on a monitor? No    Is the nurse transporting with the patient? No    At transfer, are there Patient Belongings with the patient?  If Yes, please note/list:    Surgical Waiting Area notified of patient's transfer from PACU? No

## 2024-09-22 NOTE — PROCEDURES
PROCEDURE NOTE  Date: 2024   Name: Erin Galindo  YOB: 1950    Procedures    [facility]  [address]  [place] [address]              2024          Colonoscopy Operative Report  Erin Galindo  :  1950  [facility] Medical Record Number:  [id number]      Indications:   Abdominal pain, rectal pain, bloody diarrhea according to the patient      :  Buck Real MD    Referring Provider: Hector Maddox MD    Sedation:  MAC anesthesia    Pre-Procedural Exam:      Airway: clear,  No airway problems anticipated  Heart: RRR, without gallops or rubs  Lungs: clear bilaterally without wheezes, crackles, or rhonchi  Abdomen: soft, nontender, nondistended, bowel sounds present  Mental Status: awake, alert and oriented to person, place and time     Procedure Details:  After informed consent was obtained with all risks and benefits of procedure explained and preoperative exam completed, the patient was taken to the endoscopy suite and placed in the left lateral decubitus position.  Upon sequential sedation as per above, a digital rectal exam was performed. The Olympus videocolonoscope  was inserted in the rectum and carefully advanced to the cecum, which was identified by the ileocecal valve and appendiceal orifice, terminal ileum.  The quality of preparation was fair.  Residual stool was noted throughout the colon, copious irrigation was done with sterile water to make the prep adequate for fair examination ., the colonoscope was slowly withdrawn with careful inspection and evaluation between folds. Retroflexion in the rectum was performed.    Findings:   Terminal Ileum: normal  Cecum: normal  Ascending Colon: normal  Transverse Colon:  A 10 mm polyp (Luz Maria 1-A-sessile)  was noted in the transverse colon, hot snare polypectomy was performed and complete resection was achieved  Descending Colon: normal  Sigmoid: A 8 mm polyp (Luz Maria 1-A-sessile)

## 2024-09-22 NOTE — PROGRESS NOTES
Results   Component Value Date/Time    APPEARANCE CLEAR 09/19/2024 02:32 PM    COLORU YELLOW/STRAW 09/19/2024 02:32 PM    NITRU Negative 09/19/2024 02:32 PM    GLUCOSEU Negative 09/19/2024 02:32 PM    GLUCOSEU Negative 04/21/2024 03:50 PM    KETUA Negative 09/19/2024 02:32 PM    UROBILINOGEN 0.2 09/19/2024 02:32 PM    BILIRUBINUR Negative 09/19/2024 02:32 PM       Notes reviewed from all clinical/nonclinical/nursing services involved in patient's clinical care. Care coordination discussions were held with appropriate clinical/nonclinical/ nursing providers based on care coordination needs.     Patients current active Medications were reviewed, considered, added and adjusted based on the clinical condition today.      Medications Reviewed:     Current Facility-Administered Medications   Medication Dose Route Frequency    ferric gluconate (FERRLECIT) 125 mg in sodium chloride 0.9 % 100 mL IVPB  125 mg IntraVENous Daily    hydrALAZINE (APRESOLINE) injection 15 mg  15 mg IntraVENous Q4H PRN    insulin lispro (HUMALOG,ADMELOG) injection vial 0-4 Units  0-4 Units SubCUTAneous Nightly    insulin lispro (HUMALOG,ADMELOG) injection vial 0-4 Units  0-4 Units SubCUTAneous TID WC    sodium chloride flush 0.9 % injection 5-40 mL  5-40 mL IntraVENous 2 times per day    sodium chloride flush 0.9 % injection 5-40 mL  5-40 mL IntraVENous PRN    0.9 % sodium chloride infusion   IntraVENous PRN    ondansetron (ZOFRAN-ODT) disintegrating tablet 4 mg  4 mg Oral Q8H PRN    Or    ondansetron (ZOFRAN) injection 4 mg  4 mg IntraVENous Q6H PRN    acetaminophen (TYLENOL) tablet 650 mg  650 mg Oral Q6H PRN    Or    acetaminophen (TYLENOL) suppository 650 mg  650 mg Rectal Q6H PRN    glucose chewable tablet 16 g  4 tablet Oral PRN    dextrose bolus 10% 125 mL  125 mL IntraVENous PRN    Or    dextrose bolus 10% 250 mL  250 mL IntraVENous PRN    glucagon injection 1 mg  1 mg SubCUTAneous PRN    dextrose 10 % infusion   IntraVENous Continuous PRN     buPROPion (WELLBUTRIN XL) extended release tablet 300 mg  300 mg Oral Daily    escitalopram (LEXAPRO) tablet 10 mg  10 mg Oral Daily    losartan (COZAAR) tablet 50 mg  50 mg Oral Daily    metoprolol tartrate (LOPRESSOR) tablet 25 mg  25 mg Oral BID     ______________________________________________________________________  EXPECTED LENGTH OF STAY: 4  ACTUAL LENGTH OF STAY:          3                 Bijal Burt MD

## 2024-09-22 NOTE — PLAN OF CARE
Problem: Safety - Adult  Goal: Free from fall injury  9/22/2024 0800 by Ivania Vora RN  Outcome: Progressing  9/22/2024 0314 by Barbra Garcias RN  Outcome: Progressing     Problem: Discharge Planning  Goal: Discharge to home or other facility with appropriate resources  9/22/2024 0800 by Ivania Vora RN  Outcome: Progressing  Flowsheets (Taken 9/22/2024 0754)  Discharge to home or other facility with appropriate resources: Identify barriers to discharge with patient and caregiver  9/22/2024 0314 by Barbra Garcias RN  Outcome: Progressing  Flowsheets (Taken 9/21/2024 2000)  Discharge to home or other facility with appropriate resources: Identify barriers to discharge with patient and caregiver     Problem: Pain  Goal: Verbalizes/displays adequate comfort level or baseline comfort level  Recent Flowsheet Documentation  Taken 9/22/2024 0407 by Barbra Garcias RN  Verbalizes/displays adequate comfort level or baseline comfort level:   Encourage patient to monitor pain and request assistance   Assess pain using appropriate pain scale   Administer analgesics based on type and severity of pain and evaluate response  9/22/2024 0314 by Barbra Garcias RN  Outcome: Progressing  Flowsheets  Taken 9/22/2024 0230  Verbalizes/displays adequate comfort level or baseline comfort level:   Assess pain using appropriate pain scale   Administer analgesics based on type and severity of pain and evaluate response   Implement non-pharmacological measures as appropriate and evaluate response   Encourage patient to monitor pain and request assistance  Taken 9/22/2024 0000  Verbalizes/displays adequate comfort level or baseline comfort level:   Encourage patient to monitor pain and request assistance   Assess pain using appropriate pain scale   Administer analgesics based on type and severity of pain and evaluate response  Taken 9/21/2024 1952  Verbalizes/displays adequate comfort level or baseline comfort  level:   Encourage patient to monitor pain and request assistance   Assess pain using appropriate pain scale   Administer analgesics based on type and severity of pain and evaluate response   Implement non-pharmacological measures as appropriate and evaluate response

## 2024-09-22 NOTE — PERIOP NOTE
TRANSFER - IN REPORT:    Verbal report received from CARO Ford  on Erin Galindo  being received from 209 for routine post-op      Report consisted of patient's Situation, Background, Assessment and   Recommendations(SBAR).     Information from the following report(s) Nurse Handoff Report was reviewed with the receiving nurse.    Opportunity for questions and clarification was provided.      Assessment completed upon patient's arrival to unit and care assumed.

## 2024-09-22 NOTE — PROCEDURES
PROCEDURE NOTE  Date: 2024   Name: Erin Galindo  YOB: 1950    Procedures      [facility]  [address]  [place] [address]             2024                EGD Operative Report  Erin Galindo  :  1950  [facility] Medical Record Number:  [id number]      Indication: Acute blood loss anemia, GI bleeding, bloody diarrhea, CT showed evidence of cirrhosis and gastric bypass    : Buck Real MD    Referring Provider:  Hector Maddox MD      Anesthesia/Sedation:  MAC anesthesia    Airway assessment: No airway problems anticipated    Pre-Procedural Exam:      Airway: clear, no airway problems anticipated  Heart: RRR, without gallops or rubs  Lungs: clear bilaterally without wheezes, crackles, or rhonchi  Abdomen: soft, nontender, nondistended, bowel sounds present  Mental Status: awake, alert and oriented to person, place and time       Procedure Details     After infomed consent was obtained for the procedure, with all risks and benefits of procedure explained the patient was taken to the endoscopy suite and placed in the left lateral decubitus position.  Following sequential administration of sedation as per above, the endoscope was inserted into the mouth and advanced under direct vision to second portion of the duodenum.  A careful inspection was made as the gastroscope was withdrawn, including a retroflexed view of the proximal stomach; findings and interventions are described below.      Findings:   Esophagus:no mucosal lesion appreciated.  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  Examination of gastric pouch/fundus revealed minimal

## 2024-09-22 NOTE — PROGRESS NOTES
EGD and colonoscopy performed  Complete report in procedure section  Start her on a diet  Once patient is able to tolerate diet she can possibly go home on PPI once daily  Follow-up in GI office

## 2024-09-23 VITALS
HEART RATE: 67 BPM | TEMPERATURE: 97.1 F | DIASTOLIC BLOOD PRESSURE: 79 MMHG | OXYGEN SATURATION: 93 % | HEIGHT: 60 IN | WEIGHT: 166.3 LBS | BODY MASS INDEX: 32.65 KG/M2 | SYSTOLIC BLOOD PRESSURE: 198 MMHG | RESPIRATION RATE: 16 BRPM

## 2024-09-23 LAB
ANION GAP SERPL CALC-SCNC: 2 MMOL/L (ref 2–12)
BUN SERPL-MCNC: 16 MG/DL (ref 6–20)
BUN/CREAT SERPL: 14 (ref 12–20)
CALCIUM SERPL-MCNC: 7.9 MG/DL (ref 8.5–10.1)
CCP IGA+IGG SERPL IA-ACNC: 5 UNITS (ref 0–19)
CHLORIDE SERPL-SCNC: 114 MMOL/L (ref 97–108)
CO2 SERPL-SCNC: 26 MMOL/L (ref 21–32)
CREAT SERPL-MCNC: 1.15 MG/DL (ref 0.55–1.02)
CRP SERPL-MCNC: 2.88 MG/DL (ref 0–0.3)
ERYTHROCYTE [DISTWIDTH] IN BLOOD BY AUTOMATED COUNT: 15.7 % (ref 11.5–14.5)
GLUCOSE SERPL-MCNC: 102 MG/DL (ref 65–100)
HCT VFR BLD AUTO: 22.9 % (ref 35–47)
HCT VFR BLD AUTO: 27.9 % (ref 35–47)
HGB BLD-MCNC: 7 G/DL (ref 11.5–16)
HGB BLD-MCNC: 8.3 G/DL (ref 11.5–16)
MCH RBC QN AUTO: 26.6 PG (ref 26–34)
MCHC RBC AUTO-ENTMCNC: 30.6 G/DL (ref 30–36.5)
MCV RBC AUTO: 87.1 FL (ref 80–99)
NRBC # BLD: 0 K/UL (ref 0–0.01)
NRBC BLD-RTO: 0 PER 100 WBC
PLATELET # BLD AUTO: 167 K/UL (ref 150–400)
PMV BLD AUTO: 9.6 FL (ref 8.9–12.9)
POTASSIUM SERPL-SCNC: 4.5 MMOL/L (ref 3.5–5.1)
RBC # BLD AUTO: 2.63 M/UL (ref 3.8–5.2)
RHEUMATOID FACT SERPL-ACNC: 12.8 IU/ML
SODIUM SERPL-SCNC: 142 MMOL/L (ref 136–145)
WBC # BLD AUTO: 5.7 K/UL (ref 3.6–11)

## 2024-09-23 PROCEDURE — 6370000000 HC RX 637 (ALT 250 FOR IP): Performed by: INTERNAL MEDICINE

## 2024-09-23 PROCEDURE — 86140 C-REACTIVE PROTEIN: CPT

## 2024-09-23 PROCEDURE — 80048 BASIC METABOLIC PNL TOTAL CA: CPT

## 2024-09-23 PROCEDURE — 85027 COMPLETE CBC AUTOMATED: CPT

## 2024-09-23 PROCEDURE — 36415 COLL VENOUS BLD VENIPUNCTURE: CPT

## 2024-09-23 PROCEDURE — 85018 HEMOGLOBIN: CPT

## 2024-09-23 PROCEDURE — 85014 HEMATOCRIT: CPT

## 2024-09-23 PROCEDURE — 6370000000 HC RX 637 (ALT 250 FOR IP): Performed by: FAMILY MEDICINE

## 2024-09-23 PROCEDURE — 2580000003 HC RX 258: Performed by: FAMILY MEDICINE

## 2024-09-23 RX ORDER — PANTOPRAZOLE SODIUM 40 MG/1
40 TABLET, DELAYED RELEASE ORAL
Qty: 30 TABLET | Refills: 3 | Status: SHIPPED | OUTPATIENT
Start: 2024-09-24

## 2024-09-23 RX ORDER — CARVEDILOL 12.5 MG/1
12.5 TABLET ORAL 2 TIMES DAILY WITH MEALS
Qty: 60 TABLET | Refills: 3 | Status: SHIPPED | OUTPATIENT
Start: 2024-09-23

## 2024-09-23 RX ADMIN — BUPROPION HYDROCHLORIDE 300 MG: 150 TABLET, EXTENDED RELEASE ORAL at 09:20

## 2024-09-23 RX ADMIN — SODIUM CHLORIDE, PRESERVATIVE FREE 10 ML: 5 INJECTION INTRAVENOUS at 09:20

## 2024-09-23 RX ADMIN — PANTOPRAZOLE SODIUM 40 MG: 40 TABLET, DELAYED RELEASE ORAL at 05:44

## 2024-09-23 RX ADMIN — LOSARTAN POTASSIUM 50 MG: 50 TABLET, FILM COATED ORAL at 05:44

## 2024-09-23 RX ADMIN — ESCITALOPRAM OXALATE 10 MG: 10 TABLET ORAL at 09:19

## 2024-09-23 RX ADMIN — ACETAMINOPHEN 650 MG: 325 TABLET ORAL at 04:32

## 2024-09-23 RX ADMIN — CARVEDILOL 12.5 MG: 12.5 TABLET, FILM COATED ORAL at 06:08

## 2024-09-23 ASSESSMENT — PAIN DESCRIPTION - LOCATION: LOCATION: HIP;LEG

## 2024-09-23 ASSESSMENT — PAIN SCALES - GENERAL
PAINLEVEL_OUTOF10: 0
PAINLEVEL_OUTOF10: 6

## 2024-09-23 ASSESSMENT — PAIN DESCRIPTION - DESCRIPTORS: DESCRIPTORS: ACHING

## 2024-09-23 ASSESSMENT — PAIN DESCRIPTION - ORIENTATION: ORIENTATION: RIGHT;LEFT

## 2024-09-23 NOTE — PROGRESS NOTES
[facility]                                                                               Hospitalist Progress Note  Aguilar Schwarz MD  Answering service: [phone] OR [phone] from in house phone        Date of Service:  2024  NAME:  Erin Galindo  :  1950  MRN:  [id number]       Admission Summary:   Erin Galindo is a 73 y.o. female with past medical history of hypertension, hyperlipidemia, type 2 diabetes mellitus, diabetic retinopathy, CKD, depression, DJD, nonalcoholic fatty liver, hearing loss, heart murmur, pancreatic pseudocyst, vision loss right eye presented to the emergency department via EMS with chief complaint of generalized aches and pains, generalized weakness, dizziness. Today, patient followed up with her gynecologist.  She report generalized aches and pains that was diffuse including shoulders, back, abdomen, legs.  Pain was severe, aching, constant, without specific alleviating factors.  Patient was transported via EMS to the ED.  On arrival, initial recorded vital signs were temperature 97.8 °F, /76, heart rate 96, respiratory rate 20, O2 saturation 93% on room air.  12-lead EKG showed normal sinus rhythm at 66 bpm.  Chest x-ray portable showed clear lungs.  Abnormal labs included chloride 116, BUN 29, creatinine 1.41, GFR 39, CRP 11.30, sedimentation rate 73, albumin 3.0, alkaline phosphatase 145, AST 52, hemoglobin 7.4.   ED consulted hospitalist for admission.  On arrival at the bedside, patient had multiple complaints.  She complains of pain in mostly all over her body.  When asked if she had any blood in stool, she notes that she was unable to provide any details of the when event occurred.  Patient also complains of dizziness and lightheadedness.  No reports of slurred speech, facial droop, focal weakness, new onset numbness/paresthesias.     Interval history / Subjective:   Source of information: patient  Reports trouble  hours.    Invalid input(s): \"CPKMB\", \"CKNDX\", \"TROIQ\"  No results found for: \"CHOL\", \"CHLST\", \"CHOLV\", \"HDL\", \"HDLC\", \"LDL\"  No results found for: \"GLUCPOC\"  Lab Results   Component Value Date/Time    APPEARANCE CLEAR 09/19/2024 02:32 PM    COLORU YELLOW/STRAW 09/19/2024 02:32 PM    NITRU Negative 09/19/2024 02:32 PM    GLUCOSEU Negative 09/19/2024 02:32 PM    GLUCOSEU Negative 04/21/2024 03:50 PM    KETUA Negative 09/19/2024 02:32 PM    UROBILINOGEN 0.2 09/19/2024 02:32 PM    BILIRUBINUR Negative 09/19/2024 02:32 PM       Notes reviewed from all clinical/nonclinical/nursing services involved in patient's clinical care. Care coordination discussions were held with appropriate clinical/nonclinical/ nursing providers based on care coordination needs.     Patients current active Medications were reviewed, considered, added and adjusted based on the clinical condition today.      Medications Reviewed:     Current Facility-Administered Medications   Medication Dose Route Frequency    carvedilol (COREG) tablet 12.5 mg  12.5 mg Oral BID WC    pantoprazole (PROTONIX) tablet 40 mg  40 mg Oral QAM AC    ferric gluconate (FERRLECIT) 125 mg in sodium chloride 0.9 % 100 mL IVPB  125 mg IntraVENous Daily    hydrALAZINE (APRESOLINE) injection 15 mg  15 mg IntraVENous Q4H PRN    sodium chloride flush 0.9 % injection 5-40 mL  5-40 mL IntraVENous 2 times per day    sodium chloride flush 0.9 % injection 5-40 mL  5-40 mL IntraVENous PRN    0.9 % sodium chloride infusion   IntraVENous PRN    ondansetron (ZOFRAN-ODT) disintegrating tablet 4 mg  4 mg Oral Q8H PRN    Or    ondansetron (ZOFRAN) injection 4 mg  4 mg IntraVENous Q6H PRN    acetaminophen (TYLENOL) tablet 650 mg  650 mg Oral Q6H PRN    Or    acetaminophen (TYLENOL) suppository 650 mg  650 mg Rectal Q6H PRN    glucose chewable tablet 16 g  4 tablet Oral PRN    dextrose bolus 10% 125 mL  125 mL IntraVENous PRN    Or    dextrose bolus 10% 250 mL  250 mL IntraVENous PRN    glucagon

## 2024-09-23 NOTE — DISCHARGE INSTRUCTIONS
Discharge Instructions       PATIENT ID: Erin Galindo  MRN: [id number]   YOB: 1950    DATE OF ADMISSION: [unfilled]    DATE OF DISCHARGE: 9/23/2024    PRIMARY CARE PROVIDER: @PCP@     ATTENDING PHYSICIAN: [unfilled]  DISCHARGING PROVIDER: Aguilar Schwarz MD    To contact this individual call [phone] and ask the  to page.   If unavailable ask to be transferred the Adult Hospitalist Department.    DISCHARGE DIAGNOSES Anemia    CONSULTATIONS: GI    PROCEDURES/SURGERIES: Procedure(s):  COLONOSCOPY DIAGNOSTIC  ESOPHAGOGASTRODUODENOSCOPY    PENDING TEST RESULTS:   At the time of discharge the following test results are still pending: rheumatoid panel    FOLLOW UP APPOINTMENTS:   PCP  Rheumatology  GI    ADDITIONAL CARE RECOMMENDATIONS: as above    DIET: cardiac diet    ACTIVITY: activity as tolerated    DISCHARGE MEDICATIONS:   See Medication Reconciliation Form    It is important that you take the medication exactly as they are prescribed.   Keep your medication in the bottles provided by the pharmacist and keep a list of the medication names, dosages, and times to be taken in your wallet.   Do not take other medications without consulting your doctor.       NOTIFY YOUR PHYSICIAN FOR ANY OF THE FOLLOWING:   Fever over 101 degrees for 24 hours.   Chest pain, shortness of breath, fever, chills, nausea, vomiting, diarrhea, change in mentation, falling, weakness, bleeding. Severe pain or pain not relieved by medications.  Or, any other signs or symptoms that you may have questions about.      DISPOSITION:  x  Home With:   OT  PT  HH  RN       SNF/Inpatient Rehab/LTAC    Independent/assisted living    Hospice    Other:     CDMP Checked:   Yes x     PROBLEM LIST Updated:  Yes x       Signed:   Aguilar Schwarz MD  9/23/2024  2:52 PM

## 2024-09-23 NOTE — PROGRESS NOTES
[facility]   [address] [address]       GI PROGRESS NOTE  Solange Jauregui PA-C  [phone] office  NP/PA in-hospital M-F until 4:30PM  After 5PM or on weekends, please call  for physician on call      NAME: Erin Galindo   :  1950   MRN:  [id number]       Subjective:     Patient sitting in chair at bedside. She reports feeling well this morning. No rectal bleeding or melena. Abdominal pain has resolved. Tolerating soft diet. No nausea or vomiting.     Objective:     VITALS:   Last 24hrs VS reviewed since prior progress note. Most recent are:  Vitals:    24 0745   BP: (!) 148/75   Pulse: 71   Resp:    Temp: 99 °F (37.2 °C)   SpO2:        PHYSICAL EXAM:  General: Cooperative, no acute distress    Neurologic:  Alert and oriented X 3.  HEENT: EOMI, no scleral icterus   Lungs:  CTA bilaterally. No wheezing  Heart:  S1 S2, regular rhythm  Abdomen: Soft, non-distended, no tenderness. +Bowel sounds  Extremities: No edema  Psych:   Good insight. Not anxious or agitated.    Lab Data Reviewed:     Recent Results (from the past 24 hour(s))   POCT Glucose    Collection Time: 24 11:42 AM   Result Value Ref Range    POC Glucose 109 65 - 117 mg/dL    Performed by: Trey De Luna    POCT Glucose    Collection Time: 24  4:06 PM   Result Value Ref Range    POC Glucose 91 65 - 117 mg/dL    Performed by: CRISTINO HAIR    C-Reactive Protein    Collection Time: 24  5:36 AM   Result Value Ref Range    CRP 2.88 (H) 0.00 - 0.30 mg/dL   Basic Metabolic Panel    Collection Time: 24  5:36 AM   Result Value Ref Range    Sodium 142 136 - 145 mmol/L    Potassium 4.5 3.5 - 5.1 mmol/L    Chloride 114 (H) 97 - 108 mmol/L    CO2 26 21 - 32 mmol/L    Anion Gap 2 2 - 12 mmol/L    Glucose 102 (H) 65 - 100 mg/dL    BUN 16 6 - 20 MG/DL    Creatinine 1.15 (H) 0.55 - 1.02 MG/DL    BUN/Creatinine Ratio 14 12 - 20      Est, Glom Filt Rate 50 (L) >60

## 2024-09-23 NOTE — DISCHARGE SUMMARY
Discharge Summary       PATIENT ID: Erin Galindo  MRN: [id number]   YOB: 1950    DATE OF ADMISSION: 9/19/2024  2:45 PM    DATE OF DISCHARGE: Anemia   PRIMARY CARE PROVIDER: Hector Maddox MD     ATTENDING PHYSICIAN: Dr Aguilar Schwarz  DISCHARGING PROVIDER: Aguilar Schwarz MD    To contact this individual call [phone] and ask the  to page.  If unavailable ask to be transferred the Adult Hospitalist Department.    CONSULTATIONS: IP CONSULT TO GI  IP CONSULT TO CASE MANAGEMENT    PROCEDURES/SURGERIES: Procedure(s):  COLONOSCOPY DIAGNOSTIC  ESOPHAGOGASTRODUODENOSCOPY    ADMITTING DIAGNOSES & HOSPITAL COURSE:   Acute on chronic anemia  Iron deficiency  -Unclear etiology  -hgb was 9.8 3 months ago; was 7.4 on admit then down to 7.1  -dizziness was likely symptomatic anemia  -got 1 PRBC for 7.1, post hgb 8.2; hgb stable  -cont IV iron  -Heme positive stool though no gross blood seen  -appreciate GI, s/p EGD/colonoscopy 9/22  -B12, folate wnl     Elevated CRP, ESR  Generalized pain, myalgias  -unclear etiology, ddx includes fibromyalgia, polymyalgia rheumatica, GCA (less likely)  -Rheumatoid panel is pending  -empiric prednisone 15 mg was started but caused significant restlessness, now weaned off but pt says she didn't feel noticeably better on it  -Inflammatory markers are trending down  -suggest OP referral to rheumatology  -CPK wnl  -B12, folate, TSH, ammonia wnl     Stage IIIb CKD  -Cr at baseline ~1.4     Essential hypertension, above goal  -c/b pain  -cont home meds, considered increasing ARB but K is on high side  -will switch metoprolol to coreg for better BP control  -IV hydralazine PRN  -cont to monitor and adjust accordingly     H/o Type 2 diabetes mellitus  A1c 5.5  Dc SSI since off steroid     Depression  -home Lexapro, Wellbutrin     Obesity  -BMI = 32.48 kg/M2  -f/up PCP      PENDING TEST RESULTS:   At the time of discharge the following test results are still pending:  none    FOLLOW UP APPOINTMENTS:    PCP  GI  Rheumatology    ADDITIONAL CARE RECOMMENDATIONS: as above      ACTIVITY: activity as tolerated    DISCHARGE MEDICATIONS:     Medication List        START taking these medications      buPROPion 150 MG extended release tablet  Commonly known as: WELLBUTRIN XL     carvedilol 12.5 MG tablet  Commonly known as: COREG  Take 1 tablet by mouth 2 times daily (with meals)     pantoprazole 40 MG tablet  Commonly known as: PROTONIX  Take 1 tablet by mouth every morning (before breakfast)  Start taking on: September 24, 2024            CONTINUE taking these medications      acetaminophen 500 MG Caps capsule  Commonly known as: TYLENOL     ALOE VERA PO     ASHWAGANDHA GUMMIES PO     BRAIN SUPPORT PO     CALCIUM 500 + D3 PO     escitalopram 20 MG tablet  Commonly known as: LEXAPRO     glipiZIDE 10 MG tablet  Commonly known as: GLUCOTROL     GREEN  PO     HAIR SKIN AND NAILS FORMULA PO     insulin glargine 100 UNIT/ML injection vial  Commonly known as: LANTUS  Inject 8 Units into the skin nightly     * insulin lispro 100 UNIT/ML Soln injection vial  Commonly known as: HUMALOG,ADMELOG  Inject 3 Units into the skin 3 times daily (with meals)     * insulin lispro 100 UNIT/ML Soln injection vial  Commonly known as: HUMALOG,ADMELOG  Inject 0-4 Units into the skin 3 times daily (with meals)     POTASSIUM GLUCONATE PO     sennosides-docusate sodium 8.6-50 MG tablet  Commonly known as: SENOKOT-S  Take 1 tablet by mouth 2 times daily     True Metrix Air Glucose Meter w/Device Kit     vitamin B-12 1000 MCG extended release tablet     vitamin C 500 MG tablet  Commonly known as: ASCORBIC ACID           * This list has 2 medication(s) that are the same as other medications prescribed for you. Read the directions carefully, and ask your doctor or other care provider to review them with you.                STOP taking these medications      amoxicillin-clavulanate 875-125 MG per tablet  Commonly

## 2024-09-23 NOTE — CARE COORDINATION
09/23/24 1145   Condition of Participation: Discharge Planning   The Plan for Transition of Care is related to the following treatment goals: [facility]   The Patient and/or Patient Representative was provided with a Choice of Provider? Patient   The Patient and/Or Patient Representative agree with the Discharge Plan? Yes   Freedom of Choice list was provided with basic dialogue that supports the patient's individualized plan of care/goals, treatment preferences, and shares the quality data associated with the providers?  Yes     ZAHEER CARCAMO

## 2024-09-23 NOTE — CARE COORDINATION
Transition of Care Plan:  [employer] Medicare insurance   [facility] patient       RUR: 18%    Prior Level of Functioning: Independent with adl's and iadl's  driving and using no dme    lives alone with her cat in one level apartment    Disposition: Home    If SNF or IPR: Date FOC offered: N/A  Date FOC received:   Accepting facility:   Date authorization started with reference number:   Date authorization received and expires:     Home Health   patient agreed-- [facility]  accepted  [phone]  Placed on AVS    Follow up appointments: PCP and specialist  [place] [id number]  [address] [phone]    DME needed: none indicated --has RW and cane    Transportation at discharge: Round trip to secure her car at [address]  [address]    IM/IMM Medicare/ letter given: 2nd Letter  9/23/24    Caregiver Contact:   Friend  Lilia Ribera  [phone]    Discharge Caregiver contacted prior to discharge? No    Care Conference needed? No    Barriers to discharge:  medical stability    CM met with patient to discuss transition of care planning.  She was in agreement with HH and referral sent to [facility].  Waiting for response .      Patient plans to return to her apartment with her cat when discharged.  Her car is at [facility] [address] [facility] [address]  She will need a ride to [facility] to secure her car.   Cm will arrange round trip to transport her to her car.  She has her keys..    2nd Medicare letter provided.      CM to follow and assist with HH and ride to [facility].    KASSIE WOODSON, BSW

## 2024-09-23 NOTE — PROGRESS NOTES
Spiritual Health History and Assessment/Progress Note  [facility]    Initial Encounter,  ,  ,      Name: Erin Galindo MRN: [id number]    Age: 73 y.o.     Sex: female   Language: English   Taoist: Church   GI bleed     Date: 9/23/2024            Total Time Calculated: 18 min              Spiritual Assessment began in [facility] MED SURG        Referral/Consult From: Rounding   Encounter Overview/Reason: Initial Encounter  Service Provided For: Patient    Sofia, Belief, Meaning:   Patient identifies as spiritual  Family/Friends No family/friends present      Importance and Influence:  Patient has spiritual/personal beliefs that influence decisions regarding their health  Family/Friends No family/friends present    Community:  Patient is connected with a spiritual community  Family/Friends No family/friends present    Assessment and Plan of Care:     Patient Interventions include: Facilitated expression of thoughts and feelings and Engaged in theological reflection  Family/Friends Interventions include: No family/friends present    Patient Plan of Care: Spiritual Care available upon further referral  Family/Friends Plan of Care: Spiritual Care available upon further referral    Electronically signed by Hattie Burt, Chaplain Resident on 9/23/2024 at 10:29 AM

## 2024-09-27 ENCOUNTER — HOSPITAL ENCOUNTER (EMERGENCY)
Facility: HOSPITAL | Age: 74
Discharge: HOME | End: 2024-09-27
Attending: EMERGENCY MEDICINE
Payer: MEDICARE

## 2024-09-27 ENCOUNTER — APPOINTMENT (OUTPATIENT)
Facility: HOSPITAL | Age: 74
End: 2024-09-27
Attending: EMERGENCY MEDICINE
Payer: MEDICARE

## 2024-09-27 ENCOUNTER — APPOINTMENT (OUTPATIENT)
Facility: HOSPITAL | Age: 74
End: 2024-09-27
Payer: MEDICARE

## 2024-09-27 VITALS
TEMPERATURE: 98.3 F | OXYGEN SATURATION: 94 % | HEIGHT: 60 IN | RESPIRATION RATE: 27 BRPM | SYSTOLIC BLOOD PRESSURE: 148 MMHG | WEIGHT: 162 LBS | DIASTOLIC BLOOD PRESSURE: 48 MMHG | BODY MASS INDEX: 31.8 KG/M2 | HEART RATE: 75 BPM

## 2024-09-27 DIAGNOSIS — M79.89 LEG SWELLING: ICD-10-CM

## 2024-09-27 DIAGNOSIS — S80.12XA HEMATOMA OF LEFT LOWER EXTREMITY, INITIAL ENCOUNTER: Primary | ICD-10-CM

## 2024-09-27 LAB
ALBUMIN SERPL-MCNC: 3.1 G/DL (ref 3.5–5)
ALBUMIN/GLOB SERPL: 1 (ref 1.1–2.2)
ALP SERPL-CCNC: 162 U/L (ref 45–117)
ALT SERPL-CCNC: 26 U/L (ref 12–78)
ANION GAP SERPL CALC-SCNC: 4 MMOL/L (ref 2–12)
APTT PPP: 30.5 SEC (ref 22.1–31)
AST SERPL-CCNC: 41 U/L (ref 15–37)
BASOPHILS # BLD: 0 K/UL (ref 0–0.1)
BASOPHILS NFR BLD: 0 % (ref 0–1)
BILIRUB SERPL-MCNC: 1.6 MG/DL (ref 0.2–1)
BUN SERPL-MCNC: 20 MG/DL (ref 6–20)
BUN/CREAT SERPL: 16 (ref 12–20)
CALCIUM SERPL-MCNC: 8.6 MG/DL (ref 8.5–10.1)
CHLORIDE SERPL-SCNC: 115 MMOL/L (ref 97–108)
CO2 SERPL-SCNC: 21 MMOL/L (ref 21–32)
COMMENT:: NORMAL
CREAT SERPL-MCNC: 1.29 MG/DL (ref 0.55–1.02)
D DIMER PPP FEU-MCNC: 2.54 MG/L FEU (ref 0–0.65)
DIFFERENTIAL METHOD BLD: ABNORMAL
ECHO BSA: 1.76 M2
EOSINOPHIL # BLD: 0.2 K/UL (ref 0–0.4)
EOSINOPHIL NFR BLD: 2 % (ref 0–7)
ERYTHROCYTE [DISTWIDTH] IN BLOOD BY AUTOMATED COUNT: 17.6 % (ref 11.5–14.5)
GLOBULIN SER CALC-MCNC: 3.1 G/DL (ref 2–4)
GLUCOSE SERPL-MCNC: 141 MG/DL (ref 65–100)
HCT VFR BLD AUTO: 30 % (ref 35–47)
HGB BLD-MCNC: 8.5 G/DL (ref 11.5–16)
IMM GRANULOCYTES # BLD AUTO: 0.1 K/UL (ref 0–0.04)
IMM GRANULOCYTES NFR BLD AUTO: 1 % (ref 0–0.5)
INR PPP: 1.1 (ref 0.9–1.1)
LYMPHOCYTES # BLD: 1.5 K/UL (ref 0.8–3.5)
LYMPHOCYTES NFR BLD: 15 % (ref 12–49)
MCH RBC QN AUTO: 26.3 PG (ref 26–34)
MCHC RBC AUTO-ENTMCNC: 28.3 G/DL (ref 30–36.5)
MCV RBC AUTO: 92.9 FL (ref 80–99)
MONOCYTES # BLD: 0.6 K/UL (ref 0–1)
MONOCYTES NFR BLD: 6 % (ref 5–13)
NEUTS SEG # BLD: 7.6 K/UL (ref 1.8–8)
NEUTS SEG NFR BLD: 76 % (ref 32–75)
NRBC # BLD: 0 K/UL (ref 0–0.01)
NRBC BLD-RTO: 0 PER 100 WBC
PLATELET # BLD AUTO: 234 K/UL (ref 150–400)
PMV BLD AUTO: 9.8 FL (ref 8.9–12.9)
POTASSIUM SERPL-SCNC: 5.6 MMOL/L (ref 3.5–5.1)
PROT SERPL-MCNC: 6.2 G/DL (ref 6.4–8.2)
PROTHROMBIN TIME: 11.4 SEC (ref 9–11.1)
RBC # BLD AUTO: 3.23 M/UL (ref 3.8–5.2)
RBC MORPH BLD: ABNORMAL
SODIUM SERPL-SCNC: 140 MMOL/L (ref 136–145)
SPECIMEN HOLD: NORMAL
THERAPEUTIC RANGE: NORMAL SECS (ref 58–77)
WBC # BLD AUTO: 10 K/UL (ref 3.6–11)

## 2024-09-27 PROCEDURE — 73706 CT ANGIO LWR EXTR W/O&W/DYE: CPT

## 2024-09-27 PROCEDURE — 36415 COLL VENOUS BLD VENIPUNCTURE: CPT

## 2024-09-27 PROCEDURE — 85730 THROMBOPLASTIN TIME PARTIAL: CPT

## 2024-09-27 PROCEDURE — 85610 PROTHROMBIN TIME: CPT

## 2024-09-27 PROCEDURE — 99285 EMERGENCY DEPT VISIT HI MDM: CPT

## 2024-09-27 PROCEDURE — 85025 COMPLETE CBC W/AUTO DIFF WBC: CPT

## 2024-09-27 PROCEDURE — 93971 EXTREMITY STUDY: CPT

## 2024-09-27 PROCEDURE — 85379 FIBRIN DEGRADATION QUANT: CPT

## 2024-09-27 PROCEDURE — 80053 COMPREHEN METABOLIC PANEL: CPT

## 2024-09-27 PROCEDURE — 6360000004 HC RX CONTRAST MEDICATION: Performed by: STUDENT IN AN ORGANIZED HEALTH CARE EDUCATION/TRAINING PROGRAM

## 2024-09-27 RX ORDER — IOPAMIDOL 755 MG/ML
100 INJECTION, SOLUTION INTRAVASCULAR
Status: COMPLETED | OUTPATIENT
Start: 2024-09-27 | End: 2024-09-27

## 2024-09-27 RX ADMIN — IOPAMIDOL 100 ML: 755 INJECTION, SOLUTION INTRAVENOUS at 15:19

## 2024-09-27 ASSESSMENT — LIFESTYLE VARIABLES
HOW OFTEN DO YOU HAVE A DRINK CONTAINING ALCOHOL: NEVER
HOW MANY STANDARD DRINKS CONTAINING ALCOHOL DO YOU HAVE ON A TYPICAL DAY: PATIENT DOES NOT DRINK

## 2024-09-27 ASSESSMENT — PAIN SCALES - GENERAL: PAINLEVEL_OUTOF10: 0

## 2024-09-27 ASSESSMENT — PAIN - FUNCTIONAL ASSESSMENT: PAIN_FUNCTIONAL_ASSESSMENT: 0-10

## 2024-09-27 NOTE — CARE COORDINATION
CM consult received and appreciated. Patient will need ride home. Demographic verified. Updated phone number is [phone].     Met w/patient and introduced self. Patient is ambulatory informed will set up ride ETA [id number].     Updates provided to Nursing and Patient Registrar.     Patient did not receive test call from . Will need to use Patient Registrar as point of contact for ETA /Roundtrip status.     No additional needs requested.

## 2024-09-27 NOTE — ED TRIAGE NOTES
Pt arrives from [facility] via [facility] for hypoglycemia and leg pain/swelling  in left leg. Bg for [facility] was 58, they gave crackers and peanut butter. BG came up to 230. Recently admitted for GI bleed and anemia about 3 days ago. Left leg is blue and bruised on entire leg. Denies injury. Leg is warm.

## 2024-09-27 NOTE — DISCHARGE INSTRUCTIONS
Keep the leg elevated is much as possible.  Consider applying some mild heat to the area to help absorb the blood.  Avoid any blood thinning agents such as aspirin, ibuprofen, naproxen or Aleve for any such similar medications.    Return to the emergency department if worsening swelling, difficulty breathing or any other concerns.

## 2024-09-27 NOTE — ED PROVIDER NOTES
PM      PATIENT REFERRED TO:  Hector Maddox MD  [address] [facility] [address] [phone]    Schedule an appointment as soon as possible for a visit       [facility] EMERGENCY DEP  [facility] [facility] [address] [phone]    As needed, If symptoms worsen      DISCHARGE MEDICATIONS:  New Prescriptions    No medications on file         (Please note that portions of this note were completed with a voice recognition program.  Efforts were made to edit the dictations but occasionally words are mis-transcribed.)    Blas Tomlinson MD (electronically signed)  Emergency Attending Physician / Physician Assistant / Nurse Practitioner             Blas Tomlinson MD  09/27/24 1919

## 2024-09-30 LAB — ECHO BSA: 1.76 M2

## 2025-05-07 ENCOUNTER — TRANSCRIBE ORDERS (OUTPATIENT)
Facility: HOSPITAL | Age: 75
End: 2025-05-07

## 2025-05-07 DIAGNOSIS — R10.9 ABDOMINAL PAIN, UNSPECIFIED ABDOMINAL LOCATION: Primary | ICD-10-CM

## 2025-05-14 ENCOUNTER — TRANSCRIBE ORDERS (OUTPATIENT)
Facility: HOSPITAL | Age: 75
End: 2025-05-14

## 2025-05-14 DIAGNOSIS — R10.9 ABDOMINAL PAIN, UNSPECIFIED ABDOMINAL LOCATION: Primary | ICD-10-CM

## 2025-05-19 ENCOUNTER — HOSPITAL ENCOUNTER (OUTPATIENT)
Facility: HOSPITAL | Age: 75
Discharge: HOME | End: 2025-05-22
Payer: MEDICARE

## 2025-05-19 DIAGNOSIS — R10.9 ABDOMINAL PAIN, UNSPECIFIED ABDOMINAL LOCATION: ICD-10-CM

## 2025-05-19 PROCEDURE — 74150 CT ABDOMEN W/O CONTRAST: CPT

## 2025-05-19 NOTE — CARE COORDINATION
[facility] stating pt was at [facility] for a CT scan. Pt was unable to provide details about how she arrived to hospital, first stating she came by cab then stating it was medical transport when asked if it was medical transport.     This CM called [facility] to determine if pt arrived through Medicaid transport. [facility] but [facility] were able to schedule return to home transport.      [employer] [phone]  Trip # [id number]  ETA is by 12:05    This CM walked pt out to bench at front entrance of [facility] where transport will pick pt up. Pt remained a poor historian from this morning's arrival. Pt reported she usually walks with a walker but did not think to bring it today. Pt stated she was fine if she walked slowly. CM advised pt walk carefully as there was a slight slope down to car.     NATHANAEL Chance    [facility]    or by [employer]

## (undated) DEVICE — SINGLE-USE POLYPECTOMY SNARE: Brand: CAPTIVATOR

## (undated) DEVICE — FORCEPS BX L240CM JAW DIA2.4MM ORNG L CAP W/ NDL DISP RAD

## (undated) DEVICE — TUBING IRRIG L10IN DISP PMP ENDOGATOR E

## (undated) DEVICE — RETRIEVAL DEVICE: Brand: RESCUENET™

## (undated) DEVICE — GRASPER ENDOSCP 4 PRNG 2.5 MMX160 CM ATRAUM SS TALON